# Patient Record
Sex: FEMALE | Race: WHITE | NOT HISPANIC OR LATINO | Employment: FULL TIME | URBAN - METROPOLITAN AREA
[De-identification: names, ages, dates, MRNs, and addresses within clinical notes are randomized per-mention and may not be internally consistent; named-entity substitution may affect disease eponyms.]

---

## 2017-01-04 ENCOUNTER — HOSPITAL ENCOUNTER (OUTPATIENT)
Dept: GENERAL RADIOLOGY | Facility: CLINIC | Age: 27
Discharge: HOME | End: 2017-01-04
Attending: ORTHOPAEDIC SURGERY | Admitting: ORTHOPAEDIC SURGERY
Payer: COMMERCIAL

## 2017-01-04 DIAGNOSIS — G89.29 CHRONIC HIP PAIN: ICD-10-CM

## 2017-01-04 DIAGNOSIS — M25.559 CHRONIC HIP PAIN: ICD-10-CM

## 2017-01-04 PROCEDURE — 25500064 ZZH RX 255 OP 636: Performed by: RADIOLOGY

## 2017-01-04 PROCEDURE — 25000125 ZZHC RX 250: Performed by: RADIOLOGY

## 2017-01-04 PROCEDURE — 20610 DRAIN/INJ JOINT/BURSA W/O US: CPT | Mod: LT

## 2017-01-04 RX ORDER — LIDOCAINE HYDROCHLORIDE 10 MG/ML
5 INJECTION, SOLUTION INFILTRATION; PERINEURAL ONCE
Status: COMPLETED | OUTPATIENT
Start: 2017-01-04 | End: 2017-01-04

## 2017-01-04 RX ORDER — IOPAMIDOL 408 MG/ML
50 INJECTION, SOLUTION INTRAVASCULAR ONCE
Status: COMPLETED | OUTPATIENT
Start: 2017-01-04 | End: 2017-01-04

## 2017-01-04 RX ORDER — BUPIVACAINE HYDROCHLORIDE 2.5 MG/ML
10 INJECTION, SOLUTION EPIDURAL; INFILTRATION; INTRACAUDAL ONCE
Status: COMPLETED | OUTPATIENT
Start: 2017-01-04 | End: 2017-01-04

## 2017-01-04 RX ORDER — TRIAMCINOLONE ACETONIDE 40 MG/ML
40 INJECTION, SUSPENSION INTRA-ARTICULAR; INTRAMUSCULAR ONCE
Status: COMPLETED | OUTPATIENT
Start: 2017-01-04 | End: 2017-01-04

## 2017-01-04 RX ADMIN — SODIUM BICARBONATE 0.3 MEQ: 84 INJECTION, SOLUTION INTRAVENOUS at 08:28

## 2017-01-04 RX ADMIN — IOPAMIDOL 1 ML: 408 INJECTION, SOLUTION INTRAVASCULAR at 08:29

## 2017-01-04 RX ADMIN — BUPIVACAINE HYDROCHLORIDE 4 ML: 2.5 INJECTION, SOLUTION EPIDURAL; INFILTRATION; INTRACAUDAL at 08:31

## 2017-01-04 RX ADMIN — LIDOCAINE HYDROCHLORIDE 1.2 ML: 10 INJECTION, SOLUTION EPIDURAL; INFILTRATION; INTRACAUDAL; PERINEURAL at 08:28

## 2017-01-04 RX ADMIN — TRIAMCINOLONE ACETONIDE 40 MG: 40 INJECTION, SUSPENSION INTRA-ARTICULAR; INTRAMUSCULAR at 08:31

## 2017-05-19 ENCOUNTER — OFFICE VISIT (OUTPATIENT)
Dept: FAMILY MEDICINE | Facility: CLINIC | Age: 27
End: 2017-05-19

## 2017-05-19 VITALS
HEIGHT: 60 IN | SYSTOLIC BLOOD PRESSURE: 108 MMHG | OXYGEN SATURATION: 97 % | BODY MASS INDEX: 23.95 KG/M2 | WEIGHT: 122 LBS | DIASTOLIC BLOOD PRESSURE: 69 MMHG | TEMPERATURE: 97.3 F | HEART RATE: 75 BPM

## 2017-05-19 DIAGNOSIS — N30.00 ACUTE CYSTITIS WITHOUT HEMATURIA: Primary | ICD-10-CM

## 2017-05-19 DIAGNOSIS — R30.0 DYSURIA: ICD-10-CM

## 2017-05-19 LAB
ALBUMIN UR-MCNC: NEGATIVE MG/DL
APPEARANCE UR: ABNORMAL
BACTERIA #/AREA URNS HPF: ABNORMAL /HPF
BILIRUB UR QL STRIP: NEGATIVE
COLOR UR AUTO: YELLOW
GLUCOSE UR STRIP-MCNC: NEGATIVE MG/DL
HGB UR QL STRIP: ABNORMAL
KETONES UR STRIP-MCNC: NEGATIVE MG/DL
LEUKOCYTE ESTERASE UR QL STRIP: NEGATIVE
NITRATE UR QL: POSITIVE
NON-SQ EPI CELLS #/AREA URNS LPF: ABNORMAL /LPF
PH UR STRIP: 6.5 PH (ref 5–7)
RBC #/AREA URNS AUTO: ABNORMAL /HPF (ref 0–2)
SP GR UR STRIP: 1.02 (ref 1–1.03)
URN SPEC COLLECT METH UR: ABNORMAL
UROBILINOGEN UR STRIP-ACNC: 0.2 EU/DL (ref 0.2–1)
WBC #/AREA URNS AUTO: ABNORMAL /HPF (ref 0–2)

## 2017-05-19 PROCEDURE — 87186 SC STD MICRODIL/AGAR DIL: CPT | Performed by: PHYSICIAN ASSISTANT

## 2017-05-19 PROCEDURE — 81001 URINALYSIS AUTO W/SCOPE: CPT | Performed by: PHYSICIAN ASSISTANT

## 2017-05-19 PROCEDURE — 87086 URINE CULTURE/COLONY COUNT: CPT | Performed by: PHYSICIAN ASSISTANT

## 2017-05-19 PROCEDURE — 99214 OFFICE O/P EST MOD 30 MIN: CPT | Performed by: PHYSICIAN ASSISTANT

## 2017-05-19 PROCEDURE — 87088 URINE BACTERIA CULTURE: CPT | Performed by: PHYSICIAN ASSISTANT

## 2017-05-19 RX ORDER — SULFAMETHOXAZOLE/TRIMETHOPRIM 800-160 MG
1 TABLET ORAL 2 TIMES DAILY
Qty: 14 TABLET | Refills: 0 | Status: SHIPPED | OUTPATIENT
Start: 2017-05-19 | End: 2019-07-25

## 2017-05-19 ASSESSMENT — ANXIETY QUESTIONNAIRES
6. BECOMING EASILY ANNOYED OR IRRITABLE: SEVERAL DAYS
IF YOU CHECKED OFF ANY PROBLEMS ON THIS QUESTIONNAIRE, HOW DIFFICULT HAVE THESE PROBLEMS MADE IT FOR YOU TO DO YOUR WORK, TAKE CARE OF THINGS AT HOME, OR GET ALONG WITH OTHER PEOPLE: NOT DIFFICULT AT ALL
3. WORRYING TOO MUCH ABOUT DIFFERENT THINGS: NOT AT ALL
2. NOT BEING ABLE TO STOP OR CONTROL WORRYING: NOT AT ALL
GAD7 TOTAL SCORE: 1
5. BEING SO RESTLESS THAT IT IS HARD TO SIT STILL: NOT AT ALL
7. FEELING AFRAID AS IF SOMETHING AWFUL MIGHT HAPPEN: NOT AT ALL
1. FEELING NERVOUS, ANXIOUS, OR ON EDGE: NOT AT ALL

## 2017-05-19 ASSESSMENT — PATIENT HEALTH QUESTIONNAIRE - PHQ9: 5. POOR APPETITE OR OVEREATING: NOT AT ALL

## 2017-05-19 NOTE — PATIENT INSTRUCTIONS
Rest and increase fluids.    May take motrin and tylenol as needed for discomfort.    Follow up in 2-3 days if no improvement of your symptoms. Recheck sooner if worsening pain, fevers, vomiting, or any other concerning symptoms.

## 2017-05-19 NOTE — MR AVS SNAPSHOT
After Visit Summary   5/19/2017    Kyara Davis    MRN: [id number]           Patient Information     Date Of Birth          1990        Visit Information        Provider Department      5/19/2017 3:00 PM Lyndsay Pate PA-C [facility]        Today's Diagnoses     Dysuria    -  1    Acute cystitis without hematuria          Care Instructions    Rest and increase fluids.    May take motrin and tylenol as needed for discomfort.    Follow up in 2-3 days if no improvement of your symptoms. Recheck sooner if worsening pain, fevers, vomiting, or any other concerning symptoms.          Follow-ups after your visit        Who to contact     If you have questions or need follow up information about today's clinic visit or your schedule please contact [facility] directly at [phone].  Normal or non-critical lab and imaging results will be communicated to you by [email or web address], letter or phone within 4 business days after the clinic has received the results. If you do not hear from us within 7 days, please contact the clinic through [email or web address] or phone. If you have a critical or abnormal lab result, we will notify you by phone as soon as possible.  Submit refill requests through [employer] or call your pharmacy and they will forward the refill request to us. Please allow 3 business days for your refill to be completed.          Additional Information About Your Visit        MyChart Information     [employer] gives you secure access to your electronic health record. If you see a primary care provider, you can also send messages to your care team and make appointments. If you have questions, please call your primary care clinic.  If you do not have a primary care provider, please call [phone] and they will assist you.        Care EveryWhere ID     This is your Care EveryWhere ID. This could be used by other organizations to access your [facility] medical records  [id number]         Your Vitals Were     Pulse Temperature Height Pulse Oximetry BMI (Body Mass Index)       75 97.3  F (36.3  C) (Oral) 5' (1.524 m) 97% 23.83 kg/m2        Blood Pressure from Last 3 Encounters:   05/19/17 108/69   03/25/16 110/69   02/16/16 105/64    Weight from Last 3 Encounters:   05/19/17 122 lb (55.3 kg)   03/25/16 120 lb 12.8 oz (54.8 kg)   02/16/16 126 lb (57.2 kg)              We Performed the Following     *UA reflex to Microscopic     Urine Culture Aerobic Bacterial     Urine Microscopic          Today's Medication Changes          These changes are accurate as of: 5/19/17  3:43 PM.  If you have any questions, ask your nurse or doctor.               These medicines have changed or have updated prescriptions.        Dose/Directions    sulfamethoxazole-trimethoprim 800-160 MG per tablet   Commonly known as:  BACTRIM DS/SEPTRA DS   This may have changed:  Another medication with the same name was removed. Continue taking this medication, and follow the directions you see here.   Used for:  Acute cystitis without hematuria   Changed by:  Lyndsay Pate PA-C        Dose:  1 tablet   Take 1 tablet by mouth 2 times daily   Quantity:  14 tablet   Refills:  0            Where to get your medicines      These medications were sent to [facility] [address] [facility], [facility] [address]  [address] [address] [address]     Phone:  [phone]     sulfamethoxazole-trimethoprim 800-160 MG per tablet                Primary Care Provider Office Phone # Fax #    Nathalia Montgomery [phone]       [facility] [address] [facility] [address]        Thank you!     Thank you for choosing [facility]  for your care. Our goal is always to provide you with excellent care. Hearing back from our patients is one way we can continue to improve our services. Please take a few minutes to complete the written survey that you may receive in  the mail after your visit with us. Thank you!             Your Updated Medication List - Protect others around you: Learn how to safely use, store and throw away your medicines at www.disposemymeds.org.          This list is accurate as of: 5/19/17  3:43 PM.  Always use your most recent med list.                   Brand Name Dispense Instructions for use    levonorgestrel-ethinyl estradiol 0.1-20 MG-MCG per tablet    AVIANE,ALESSE,LESSINA    84 tablet    Take 1 tablet by mouth daily       sulfamethoxazole-trimethoprim 800-160 MG per tablet    BACTRIM DS/SEPTRA DS    14 tablet    Take 1 tablet by mouth 2 times daily

## 2017-05-19 NOTE — PROGRESS NOTES
SUBJECTIVE:                                                    Kyara Davis is a 26 year old female who presents to clinic today for the following health issues:      URINARY TRACT SYMPTOMS     Onset: x 4-5 days    Description:   Painful urination (Dysuria): YES   Blood in urine (Hematuria): no  Delay in urine (Hesitency): no  Frequency: YES    Intensity: moderate    Progression of Symptoms:  worsening    Accompanying Signs & Symptoms:  Fever/chills: no   Flank pain no   Nausea and vomiting: no   Any vaginal symptoms: none  Abdominal/Pelvic Pain: no    History:   History of frequent UTI's: YES - last UTI was 2 years ago  History of kidney stones: no   Sexually Active: YES  Possibility of pregnancy: No    Precipitating factors:   none         Therapies Tried and outcome: none            Problem list and histories reviewed & adjusted, as indicated.  Additional history: none    Patient Active Problem List   Diagnosis     CARDIOVASCULAR SCREENING; LDL GOAL LESS THAN 160     Migraine     MVP (mitral valve prolapse)     Seasonal allergic rhinitis     Leg cramps in pregnancy     GERD (gastroesophageal reflux disease)     Past Surgical History:   Procedure Laterality Date     BIOPSY BREAST      1 month ago     BREAST SURGERY  2011    lump       Social History   Substance Use Topics     Smoking status: Never Smoker     Smokeless tobacco: Never Used     Alcohol use 0.0 oz/week     0 Standard drinks or equivalent per week     Family History   Problem Relation Age of Onset     Other Cancer Maternal Grandfather      full body     Other Cancer Paternal Grandmother      ovarian     DIABETES Paternal Grandfather      type 2     DIABETES Niece      type 1         Current Outpatient Prescriptions   Medication Sig Dispense Refill     sulfamethoxazole-trimethoprim (BACTRIM DS/SEPTRA DS) 800-160 MG per tablet Take 1 tablet by mouth 2 times daily 14 tablet 0     levonorgestrel-ethinyl estradiol (AVIANE,ALESSE,LESSINA) 0.1-20  MG-MCG per tablet Take 1 tablet by mouth daily 84 tablet 3     Allergies   Allergen Reactions     Vicodin [Hydrocodone-Acetaminophen] Nausea     BP Readings from Last 3 Encounters:   05/19/17 108/69   03/25/16 110/69   02/16/16 105/64    Wt Readings from Last 3 Encounters:   05/19/17 122 lb (55.3 kg)   03/25/16 120 lb 12.8 oz (54.8 kg)   02/16/16 126 lb (57.2 kg)                    Reviewed and updated as needed this visit by clinical staff  Tobacco  Allergies  Meds  Med Hx  Surg Hx  Fam Hx  Soc Hx      Reviewed and updated as needed this visit by Provider         ROS:  Constitutional, gi and gu systems are negative, except as otherwise noted.    OBJECTIVE:                                                    /69  Pulse 75  Temp 97.3  F (36.3  C) (Oral)  Ht 5' (1.524 m)  Wt 122 lb (55.3 kg)  SpO2 97%  BMI 23.83 kg/m2  Body mass index is 23.83 kg/(m^2).  GENERAL: healthy, alert and no distress  ABDOMEN: soft, nontender, no hepatosplenomegaly, no masses and bowel sounds normal  MS: no gross musculoskeletal defects noted, no edema  SKIN: no suspicious lesions or rashes  BACK: no CVA tenderness, no paralumbar tenderness    Diagnostic Test Results:  Results for orders placed or performed in visit on 05/19/17 (from the past 24 hour(s))   *UA reflex to Microscopic   Result Value Ref Range    Color Urine Yellow     Appearance Urine Cloudy     Glucose Urine Negative NEG mg/dL    Bilirubin Urine Negative NEG    Ketones Urine Negative NEG mg/dL    Specific Gravity Urine 1.020 1.003 - 1.035    Blood Urine Moderate (A) NEG    pH Urine 6.5 5.0 - 7.0 pH    Protein Albumin Urine Negative NEG mg/dL    Urobilinogen Urine 0.2 0.2 - 1.0 EU/dL    Nitrite Urine Positive (A) NEG    Leukocyte Esterase Urine Negative NEG    Source Midstream Urine    Urine Microscopic   Result Value Ref Range    WBC Urine 5-10 (A) 0 - 2 /HPF    RBC Urine 2-5 (A) 0 - 2 /HPF    Squamous Epithelial /LPF Urine Few FEW /LPF    Bacteria Urine Many  (A) NEG /HPF        ASSESSMENT/PLAN:                                                        ICD-10-CM    1. Acute cystitis without hematuria N30.00 sulfamethoxazole-trimethoprim (BACTRIM DS/SEPTRA DS) 800-160 MG per tablet   2. Dysuria R30.0 *UA reflex to Microscopic     Urine Culture Aerobic Bacterial     Urine Microscopic       Patient Instructions   Rest and increase fluids.    May take motrin and tylenol as needed for discomfort.    Follow up in 2-3 days if no improvement of your symptoms. Recheck sooner if worsening pain, fevers, vomiting, or any other concerning symptoms.        Lyndsay Pate PA-C  [facility]

## 2017-05-19 NOTE — NURSING NOTE
Chief Complaint   Patient presents with     UTI       Initial /69  Pulse 75  Temp 97.3  F (36.3  C) (Oral)  Ht 5' (1.524 m)  Wt 122 lb (55.3 kg)  SpO2 97%  BMI 23.83 kg/m2 Estimated body mass index is 23.83 kg/(m^2) as calculated from the following:    Height as of this encounter: 5' (1.524 m).    Weight as of this encounter: 122 lb (55.3 kg).  Medication Reconciliation: complete     Rosemary Cordero, cma

## 2017-05-20 ASSESSMENT — ANXIETY QUESTIONNAIRES: GAD7 TOTAL SCORE: 1

## 2017-05-20 ASSESSMENT — PATIENT HEALTH QUESTIONNAIRE - PHQ9: SUM OF ALL RESPONSES TO PHQ QUESTIONS 1-9: 3

## 2017-05-21 LAB
BACTERIA SPEC CULT: ABNORMAL
MICRO REPORT STATUS: ABNORMAL
MICROORGANISM SPEC CULT: ABNORMAL
SPECIMEN SOURCE: ABNORMAL

## 2019-04-19 ENCOUNTER — HEALTH MAINTENANCE LETTER (OUTPATIENT)
Age: 29
End: 2019-04-19

## 2019-06-13 ENCOUNTER — OFFICE VISIT (OUTPATIENT)
Dept: ORTHOPEDICS | Facility: CLINIC | Age: 29
End: 2019-06-13
Payer: MEDICAID

## 2019-06-13 ENCOUNTER — ANCILLARY PROCEDURE (OUTPATIENT)
Dept: GENERAL RADIOLOGY | Facility: CLINIC | Age: 29
End: 2019-06-13
Attending: PEDIATRICS
Payer: MEDICAID

## 2019-06-13 VITALS
SYSTOLIC BLOOD PRESSURE: 100 MMHG | WEIGHT: 127 LBS | DIASTOLIC BLOOD PRESSURE: 69 MMHG | BODY MASS INDEX: 23.37 KG/M2 | HEIGHT: 62 IN

## 2019-06-13 DIAGNOSIS — M25.511 RIGHT SHOULDER PAIN, UNSPECIFIED CHRONICITY: Primary | ICD-10-CM

## 2019-06-13 DIAGNOSIS — M25.511 RIGHT SHOULDER PAIN, UNSPECIFIED CHRONICITY: ICD-10-CM

## 2019-06-13 PROCEDURE — 99204 OFFICE O/P NEW MOD 45 MIN: CPT | Performed by: PEDIATRICS

## 2019-06-13 PROCEDURE — 73030 X-RAY EXAM OF SHOULDER: CPT | Mod: RT

## 2019-06-13 ASSESSMENT — MIFFLIN-ST. JEOR: SCORE: 1249.32

## 2019-06-13 NOTE — LETTER
6/13/2019         RE: Kyara Davis  [address]  [facility] [address]        Dear Colleague,    Thank you for referring your patient, Kyara Davis, to the [facility]. Please see a copy of my visit note below.    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 She reports no injury. She has been seeing a chiropractor for the last 2 weeks with increasing pain. She reports any use of her right arm increases her pain. She states her pain is in the lateral shoulder and radiates to her upper arm. She denies neck pain. She is right hand dominate    Location of Pain: right lateral shoulder  Duration of Pain: 4 week(s)  Rating of Pain at worst: 10/10  Rating of Pain Currently: 1/10  Symptoms are better with: nothing  Symptoms are worse with: any use of right arm  Additional Features:   Positive: popping and weakness   Negative: swelling, bruising, grinding, catching, locking, instability, paresthesias and numbness  Other evaluation and/or treatments so far consists of: Ibuprofen and chiropractic care  Prior History of related problems: nothing    Social History: business owner, computer work    Review of Systems  Skin: no bruising, no swelling  Musculoskeletal: as above  Neurologic: no numbness, paresthesias  Remainder of review of systems is negative including constitutional, CV, pulmonary, GI, except as noted in HPI or medical history.    Patient's current problem list, past medical and surgical history, and family history were reviewed.    Patient Active Problem List   Diagnosis     CARDIOVASCULAR SCREENING; LDL GOAL LESS THAN 160     Migraine     MVP (mitral valve prolapse)     Seasonal allergic rhinitis     Leg cramps in pregnancy     GERD (gastroesophageal reflux disease)     Past Medical  "History:   Diagnosis Date     Coronary artery disease 2001    prolapsed mitral valve     Mitral valve prolapse unknown     Past Surgical History:   Procedure Laterality Date     BIOPSY BREAST      1 month ago     BREAST SURGERY  2011    lump     Family History   Problem Relation Age of Onset     Other Cancer Maternal Grandfather         full body     Other Cancer Paternal Grandmother         ovarian     Diabetes Paternal Grandfather         type 2     Diabetes Niece         type 1         Objective  /69   Ht 1.575 m (5' 2\")   Wt 57.6 kg (127 lb)   BMI 23.23 kg/m       GENERAL APPEARANCE: healthy, alert and no distress   GAIT: NORMAL  SKIN: no suspicious lesions or rashes  HEENT: Sclera clear, anicteric  CV: good peripheral pulses  RESP: Breathing not labored  NEURO: Normal strength and tone, mentation intact and speech normal  PSYCH:  mentation appears normal and affect normal/bright    Cervical Spine Exam  Inspection:       No visible deformity        normal lordotic curvature maintained    Posture:      normal    Tender:      none    Non-Tender:      remainder of cervical spine area    Range of Motion:       Full active and passive ROM forward flexion, extension, lateral rotation, lateral flexion.    Painful Motions:      none    Strength:     C4 (shoulder shrug)  symmetric 5/5       C5 (shoulder abduction) symmetric 5/5       C6 (elbow flexion) symmetric 5/5       C7 (elbow extension) symmetric 5/5       C8 (finger abduction, thumb flexion) symmetric 5/5    Reflexes:      C5 (biceps) symmetric normal       C6 (supinator) symmetric normal       C7 (triceps) symmetric normal    Sensation:     grossly intact througout bilateral upper extremities    Special Tests:      neg (-) Spurling    Skin:     well perfused       capillary refill brisk    Lymphatics:      no edema noted in the upper extremities     Bilateral Shoulder exam    Inspection and Posture:       rounded shoulders and upper back    Skin:        " no visible deformities    Tender:        subacromial space right    Non Tender:       remainder of shoulder bilateral    ROM:        Full active and passive ROM with flexion, extension, abduction, internal and external rotation bilateral       asymmetric scapular motion    Painful motions:       end range flexion and elevation right    Strength:        abduction 5/5 bilateral       flexion 5/5 bilateral       internal rotation 5/5 bilateral       external rotation 3/5 right    Impingement testing:       positive (+) Neer right       positive (+) Fang right    Sensation:        normal sensation over shoulder and upper extremity       Radiology  I ordered, visualized and reviewed these images with the patient  3 XR views of right shoulder reviewed: no acute bony abnormality, no significant degenerative change  - will follow official read    Assessment:  1. Right shoulder pain, unspecified chronicity      Concern for rotator cuff tear, I recommend MRI.  Cervical etiology less likely.  PT in the interim.  Discussed OTC medications (I reviewed the mechanism of action as well as risk/benefit profile of medications. Questions answered.)    Plan:  - Today's Plan of Care:  Over the counter medication: Acetaminophen (Tylenol) 1000mg every 6 hours with food (Maximum of 3000mg/day)  Naproxen (Aleve) maximum of 440mg two times a day with food  MRI of the right shoulder. Call [phone] to schedule MRI  Rehab: Physical Therapy: [facility] - [phone]    -We also discussed other future treatment options:  Referral to orthopedic surgeon    Follow Up: In clinic with Dr. Mayers after MRI (wait at least 1-2 days)    Concerning signs and symptoms were reviewed.  The patient expressed understanding of this management plan and all questions were answered at this time.    Narda Mayers MD [facility]  Primary Care Sports Medicine  [facility]    Again, thank you for allowing me to  participate in the care of your patient.        Sincerely,        Narda Mayers MD

## 2019-06-13 NOTE — PROGRESS NOTES
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 She reports no injury. She has been seeing a chiropractor for the last 2 weeks with increasing pain. She reports any use of her right arm increases her pain. She states her pain is in the lateral shoulder and radiates to her upper arm. She denies neck pain. She is right hand dominate    Location of Pain: right lateral shoulder  Duration of Pain: 4 week(s)  Rating of Pain at worst: 10/10  Rating of Pain Currently: 1/10  Symptoms are better with: nothing  Symptoms are worse with: any use of right arm  Additional Features:   Positive: popping and weakness   Negative: swelling, bruising, grinding, catching, locking, instability, paresthesias and numbness  Other evaluation and/or treatments so far consists of: Ibuprofen and chiropractic care  Prior History of related problems: nothing    Social History: business owner, computer work    Review of Systems  Skin: no bruising, no swelling  Musculoskeletal: as above  Neurologic: no numbness, paresthesias  Remainder of review of systems is negative including constitutional, CV, pulmonary, GI, except as noted in HPI or medical history.    Patient's current problem list, past medical and surgical history, and family history were reviewed.    Patient Active Problem List   Diagnosis     CARDIOVASCULAR SCREENING; LDL GOAL LESS THAN 160     Migraine     MVP (mitral valve prolapse)     Seasonal allergic rhinitis     Leg cramps in pregnancy     GERD (gastroesophageal reflux disease)     Past Medical History:   Diagnosis Date     Coronary artery disease 2001    prolapsed mitral valve     Mitral valve prolapse unknown     Past Surgical History:   Procedure Laterality Date     BIOPSY BREAST      1 month ago     BREAST SURGERY  2011    lump     Family History  "  Problem Relation Age of Onset     Other Cancer Maternal Grandfather         full body     Other Cancer Paternal Grandmother         ovarian     Diabetes Paternal Grandfather         type 2     Diabetes Niece         type 1         Objective  /69   Ht 1.575 m (5' 2\")   Wt 57.6 kg (127 lb)   BMI 23.23 kg/m      GENERAL APPEARANCE: healthy, alert and no distress   GAIT: NORMAL  SKIN: no suspicious lesions or rashes  HEENT: Sclera clear, anicteric  CV: good peripheral pulses  RESP: Breathing not labored  NEURO: Normal strength and tone, mentation intact and speech normal  PSYCH:  mentation appears normal and affect normal/bright    Cervical Spine Exam  Inspection:       No visible deformity        normal lordotic curvature maintained    Posture:      normal    Tender:      none    Non-Tender:      remainder of cervical spine area    Range of Motion:       Full active and passive ROM forward flexion, extension, lateral rotation, lateral flexion.    Painful Motions:      none    Strength:     C4 (shoulder shrug)  symmetric 5/5       C5 (shoulder abduction) symmetric 5/5       C6 (elbow flexion) symmetric 5/5       C7 (elbow extension) symmetric 5/5       C8 (finger abduction, thumb flexion) symmetric 5/5    Reflexes:      C5 (biceps) symmetric normal       C6 (supinator) symmetric normal       C7 (triceps) symmetric normal    Sensation:     grossly intact througout bilateral upper extremities    Special Tests:      neg (-) Spurling    Skin:     well perfused       capillary refill brisk    Lymphatics:      no edema noted in the upper extremities     Bilateral Shoulder exam    Inspection and Posture:       rounded shoulders and upper back    Skin:        no visible deformities    Tender:        subacromial space right    Non Tender:       remainder of shoulder bilateral    ROM:        Full active and passive ROM with flexion, extension, abduction, internal and external rotation bilateral       asymmetric " scapular motion    Painful motions:       end range flexion and elevation right    Strength:        abduction 5/5 bilateral       flexion 5/5 bilateral       internal rotation 5/5 bilateral       external rotation 3/5 right    Impingement testing:       positive (+) Neer right       positive (+) Fang right    Sensation:        normal sensation over shoulder and upper extremity       Radiology  I ordered, visualized and reviewed these images with the patient  3 XR views of right shoulder reviewed: no acute bony abnormality, no significant degenerative change  - will follow official read    Assessment:  1. Right shoulder pain, unspecified chronicity      Concern for rotator cuff tear, I recommend MRI.  Cervical etiology less likely.  PT in the interim.  Discussed OTC medications (I reviewed the mechanism of action as well as risk/benefit profile of medications. Questions answered.)    Plan:  - Today's Plan of Care:  Over the counter medication: Acetaminophen (Tylenol) 1000mg every 6 hours with food (Maximum of 3000mg/day)  Naproxen (Aleve) maximum of 440mg two times a day with food  MRI of the right shoulder. Call [phone] to schedule MRI  Rehab: Physical Therapy: [facility] - [phone]    -We also discussed other future treatment options:  Referral to orthopedic surgeon    Follow Up: In clinic with Dr. Mayers after MRI (wait at least 1-2 days)    Concerning signs and symptoms were reviewed.  The patient expressed understanding of this management plan and all questions were answered at this time.    Narda Mayers MD CAQ  Primary Care Sports Medicine  [facility]

## 2019-06-13 NOTE — PATIENT INSTRUCTIONS
Plan:  - Today's Plan of Care:  Over the counter medication: Acetaminophen (Tylenol) 1000mg every 6 hours with food (Maximum of 3000mg/day)  Naproxen (Aleve) maximum of 440mg two times a day with food  MRI of the right shoulder. Call [phone] to schedule MRI  Rehab: Physical Therapy: [facility] - [phone]    -We also discussed other future treatment options:  Referral to orthopedic surgeon    Follow Up: In clinic with Dr. Mayers after MRI (wait at least 1-2 days)    If you have any further questions for your physician or physician s care team you can call [phone] and use option 3 to leave a voice message. Calls received during business hours will be returned same day.

## 2019-06-18 ENCOUNTER — ANCILLARY PROCEDURE (OUTPATIENT)
Dept: MRI IMAGING | Facility: CLINIC | Age: 29
End: 2019-06-18
Attending: PEDIATRICS
Payer: MEDICAID

## 2019-06-18 DIAGNOSIS — M25.511 RIGHT SHOULDER PAIN, UNSPECIFIED CHRONICITY: ICD-10-CM

## 2019-06-18 PROCEDURE — 73221 MRI JOINT UPR EXTREM W/O DYE: CPT | Mod: TC

## 2019-06-20 ENCOUNTER — OFFICE VISIT (OUTPATIENT)
Dept: ORTHOPEDICS | Facility: CLINIC | Age: 29
End: 2019-06-20
Payer: MEDICAID

## 2019-06-20 VITALS
HEIGHT: 62 IN | SYSTOLIC BLOOD PRESSURE: 111 MMHG | BODY MASS INDEX: 23.37 KG/M2 | DIASTOLIC BLOOD PRESSURE: 73 MMHG | WEIGHT: 127 LBS

## 2019-06-20 DIAGNOSIS — M25.511 RIGHT SHOULDER PAIN, UNSPECIFIED CHRONICITY: Primary | ICD-10-CM

## 2019-06-20 DIAGNOSIS — M25.819 CYST OF JOINT OF SHOULDER: ICD-10-CM

## 2019-06-20 PROCEDURE — 99214 OFFICE O/P EST MOD 30 MIN: CPT | Performed by: PEDIATRICS

## 2019-06-20 ASSESSMENT — MIFFLIN-ST. JEOR: SCORE: 1249.32

## 2019-06-20 NOTE — PROGRESS NOTES
Sports Medicine Clinic Visit - Interim History June 20, 2019    PCP: Nathalia Montgomery    Kyara Davis is a 29 year old female who is seen in f/u up for Right shoulder pain, unspecified chronicity. Since last visit on 6/13/19 patient has completed an MRI of the right shoulder. She reports no significant change in her symptoms.  Here to review results.    Initial Injury 6/13/2019: She reports 4 weeks of right shoulder pain. She reports no injury. She has been seeing a chiropractor for the last 2 weeks with increasing pain. She reports any use of her right arm increases her pain. She states her pain is in the lateral shoulder and radiates to her upper arm. She denies neck pain. She is right hand dominate    Symptoms are better with: Ibuprofen and tylenol  Symptoms are worse with: use of right arm  Additional Features:   Positive: popping and weakness   Negative: swelling, bruising, grinding, catching, locking, instability, paresthesias and numbness    Social History: business owner, computer work    Review of Systems  Skin: no bruising, no swelling  Musculoskeletal: as above  Neurologic: no numbness, paresthesias  Remainder of review of systems is negative including constitutional, CV, pulmonary, GI, except as noted in HPI or medical history.    Patient's current problem list, past medical and surgical history, and family history were reviewed.    Patient Active Problem List   Diagnosis     CARDIOVASCULAR SCREENING; LDL GOAL LESS THAN 160     Migraine     MVP (mitral valve prolapse)     Seasonal allergic rhinitis     Leg cramps in pregnancy     GERD (gastroesophageal reflux disease)     Past Medical History:   Diagnosis Date     Coronary artery disease 2001    prolapsed mitral valve     Mitral valve prolapse unknown     Past Surgical History:   Procedure Laterality Date     BIOPSY BREAST      1 month ago     BREAST SURGERY  2011    lump     Family History   Problem Relation Age of Onset     Other Cancer  "Maternal Grandfather         full body     Other Cancer Paternal Grandmother         ovarian     Diabetes Paternal Grandfather         type 2     Diabetes Niece         type 1       Objective  /73 (BP Location: Right arm, Patient Position: Chair, Cuff Size: Adult Regular)   Ht 1.575 m (5' 2\")   Wt 57.6 kg (127 lb)   BMI 23.23 kg/m      GENERAL APPEARANCE: healthy, alert and no distress   GAIT: NORMAL  SKIN: no suspicious lesions or rashes  HEENT: Sclera clear, anicteric  CV: good peripheral pulses  RESP: Breathing not labored  NEURO: Normal strength and tone, mentation intact and speech normal  PSYCH:  mentation appears normal and affect normal/bright    Bilateral Shoulder exam  Inspection and Posture:       rounded shoulders and upper back     Skin:        no visible deformities     Tender:        subacromial space right     Non Tender:       remainder of shoulder bilateral     ROM:        Full active and passive ROM with flexion, extension, abduction, internal and external rotation bilateral       asymmetric scapular motion     Painful motions:       end range flexion and elevation right     Strength:        abduction 5/5 bilateral       flexion 5/5 bilateral       internal rotation 5/5 bilateral       external rotation 3/5 right     Impingement testing:       positive (+) Neer right       positive (+) Fang right     Sensation:        normal sensation over shoulder and upper extremity     Radiology  I ordered, visualized and reviewed these images with the patient  MRI RIGHT SHOULDER WITHOUT CONTRAST  6/18/2019 7:49 AM     HISTORY: Four weeks of right shoulder pain. Evaluate for rotator cuff  tear.     COMPARISON: Radiographs on 6/13/2019.     TECHNIQUE: Multiplanar MR imaging was performed without contrast.     FINDINGS:  Osseous acromion outlet: There is a type II configuration of the  acromion with no significant lateral or anterior downsloping. There  are no degenerative changes of the " acromioclavicular joint. The outlet  is widely patent. No os acromiale.     Rotator cuff: The supraspinatus, infraspinatus, subscapularis, and  teres minor tendons and visualized muscles are normal.      Labrum: No tear or other labral pathology is demonstrated. However,  there is a 2.1 cm long somewhat lobulated cyst situated just medial to  the posterior superior labrum extending towards the spinoglenoid  notch. A thin extension of this fluid is seen adjacent to the  posterior superior labrum.     Biceps tendon: The biceps tendon is in the bicipital groove. It is  intact and demonstrates normal signal.     Osseous structures and cartilaginous surfaces: No fracture or osseous  lesion is seen. No abnormal marrow signal intensity is identified. The  cartilage surfaces are well preserved.     Additional findings: No joint effusion is demonstrated. There is no  fluid within the subacromial-subdeltoid bursa. The adjacent soft  tissues are unremarkable.                                                                      IMPRESSION: There is a 2.1 cm long cyst adjacent to the posterior  superior labrum extending into the spinoglenoid notch. Although no  definite labral pathology is seen, this has the appearance of a  paralabral cyst which is often associated with labral abnormalities.  An MRI arthrogram may be beneficial for further evaluation of the  labrum. No rotator cuff pathology is seen.       Assessment:  1. Right shoulder pain, unspecified chronicity    2. Cyst of joint of shoulder      We discussed these other possible diagnosis: labral cyst, labral tear  Right shoulder pain with labral cyst impinging spinoglenoid notch.  Discussed trial of physical therapy, will also refer to shoulder surgeon for follow up and discussion of next steps in treatment.    Plan:  - Today's Plan of Care:  Referral to an Orthopedic Surgeon - Shoulder  Rehab: Physical Therapy: [facility] - [phone]    Follow  Up: as needed    Concerning signs and symptoms were reviewed.  The patient expressed understanding of this management plan and all questions were answered at this time.    Narda Mayers MD CA  Primary Care Sports Medicine  [facility]

## 2019-06-20 NOTE — LETTER
6/20/2019         RE: Kyara Davis  [address]  [facility] [address]        Dear Colleague,    Thank you for referring your patient, Kyara Davis, to the [facility]. Please see a copy of my visit note below.    Sports Medicine Clinic Visit - Interim History June 20, 2019    PCP: Nathalia Montgomery    Kyara Davis is a 29 year old female who is seen in f/u up for Right shoulder pain, unspecified chronicity. Since last visit on 6/13/19 patient has completed an MRI of the right shoulder. She reports no significant change in her symptoms.  Here to review results.    Initial Injury 6/13/2019: She reports 4 weeks of right shoulder pain. She reports no injury. She has been seeing a chiropractor for the last 2 weeks with increasing pain. She reports any use of her right arm increases her pain. She states her pain is in the lateral shoulder and radiates to her upper arm. She denies neck pain. She is right hand dominate    Symptoms are better with: Ibuprofen and tylenol  Symptoms are worse with: use of right arm  Additional Features:   Positive: popping and weakness   Negative: swelling, bruising, grinding, catching, locking, instability, paresthesias and numbness    Social History: business owner, computer work    Review of Systems  Skin: no bruising, no swelling  Musculoskeletal: as above  Neurologic: no numbness, paresthesias  Remainder of review of systems is negative including constitutional, CV, pulmonary, GI, except as noted in HPI or medical history.    Patient's current problem list, past medical and surgical history, and family history were reviewed.    Patient Active Problem List   Diagnosis     CARDIOVASCULAR SCREENING; LDL GOAL LESS THAN 160     Migraine     MVP (mitral valve prolapse)     Seasonal allergic rhinitis     Leg cramps in pregnancy     GERD (gastroesophageal reflux disease)     Past Medical History:   Diagnosis Date     Coronary artery disease  "2001    prolapsed mitral valve     Mitral valve prolapse unknown     Past Surgical History:   Procedure Laterality Date     BIOPSY BREAST      1 month ago     BREAST SURGERY  2011    lump     Family History   Problem Relation Age of Onset     Other Cancer Maternal Grandfather         full body     Other Cancer Paternal Grandmother         ovarian     Diabetes Paternal Grandfather         type 2     Diabetes Niece         type 1       Objective  /73 (BP Location: Right arm, Patient Position: Chair, Cuff Size: Adult Regular)   Ht 1.575 m (5' 2\")   Wt 57.6 kg (127 lb)   BMI 23.23 kg/m       GENERAL APPEARANCE: healthy, alert and no distress   GAIT: NORMAL  SKIN: no suspicious lesions or rashes  HEENT: Sclera clear, anicteric  CV: good peripheral pulses  RESP: Breathing not labored  NEURO: Normal strength and tone, mentation intact and speech normal  PSYCH:  mentation appears normal and affect normal/bright    Bilateral Shoulder exam  Inspection and Posture:       rounded shoulders and upper back     Skin:        no visible deformities     Tender:        subacromial space right     Non Tender:       remainder of shoulder bilateral     ROM:        Full active and passive ROM with flexion, extension, abduction, internal and external rotation bilateral       asymmetric scapular motion     Painful motions:       end range flexion and elevation right     Strength:        abduction 5/5 bilateral       flexion 5/5 bilateral       internal rotation 5/5 bilateral       external rotation 3/5 right     Impingement testing:       positive (+) Neer right       positive (+) Fang right     Sensation:        normal sensation over shoulder and upper extremity     Radiology  I ordered, visualized and reviewed these images with the patient  MRI RIGHT SHOULDER WITHOUT CONTRAST  6/18/2019 7:49 AM     HISTORY: Four weeks of right shoulder pain. Evaluate for rotator cuff  tear.     COMPARISON: Radiographs on " 6/13/2019.     TECHNIQUE: Multiplanar MR imaging was performed without contrast.     FINDINGS:  Osseous acromion outlet: There is a type II configuration of the  acromion with no significant lateral or anterior downsloping. There  are no degenerative changes of the acromioclavicular joint. The outlet  is widely patent. No os acromiale.     Rotator cuff: The supraspinatus, infraspinatus, subscapularis, and  teres minor tendons and visualized muscles are normal.      Labrum: No tear or other labral pathology is demonstrated. However,  there is a 2.1 cm long somewhat lobulated cyst situated just medial to  the posterior superior labrum extending towards the spinoglenoid  notch. A thin extension of this fluid is seen adjacent to the  posterior superior labrum.     Biceps tendon: The biceps tendon is in the bicipital groove. It is  intact and demonstrates normal signal.     Osseous structures and cartilaginous surfaces: No fracture or osseous  lesion is seen. No abnormal marrow signal intensity is identified. The  cartilage surfaces are well preserved.     Additional findings: No joint effusion is demonstrated. There is no  fluid within the subacromial-subdeltoid bursa. The adjacent soft  tissues are unremarkable.                                                                      IMPRESSION: There is a 2.1 cm long cyst adjacent to the posterior  superior labrum extending into the spinoglenoid notch. Although no  definite labral pathology is seen, this has the appearance of a  paralabral cyst which is often associated with labral abnormalities.  An MRI arthrogram may be beneficial for further evaluation of the  labrum. No rotator cuff pathology is seen.       Assessment:  1. Right shoulder pain, unspecified chronicity    2. Cyst of joint of shoulder      We discussed these other possible diagnosis: labral cyst, labral tear  Right shoulder pain with labral cyst impinging spinoglenoid notch.  Discussed trial of physical  therapy, will also refer to shoulder surgeon for follow up and discussion of next steps in treatment.    Plan:  - Today's Plan of Care:  Referral to an Orthopedic Surgeon - Shoulder  Rehab: Physical Therapy: [facility] - [phone]    Follow Up: as needed    Concerning signs and symptoms were reviewed.  The patient expressed understanding of this management plan and all questions were answered at this time.    Narda Mayers MD [facility]  Primary Care Sports Medicine  [facility]    Again, thank you for allowing me to participate in the care of your patient.        Sincerely,        Narda Mayers MD

## 2019-06-20 NOTE — PATIENT INSTRUCTIONS
We discussed these other possible diagnosis: labral cyst, labral tear    Plan:  - Today's Plan of Care:  Referral to an Orthopedic Surgeon - Shoulder  Rehab: Physical Therapy: [facility] - [phone]    Follow Up: as needed    If you have any further questions for your physician or physician s care team you can call [phone] and use option 3 to leave a voice message. Calls received during business hours will be returned same day.

## 2019-06-24 NOTE — TELEPHONE ENCOUNTER
surgical consult // R Shoulder Pain // Dr. Mayers at  [facility] referring // referral/recs in system // OK     RECORDS RECEIVED FROM: Internal   DATE RECEIVED: 07/08/19   NOTES STATUS DETAILS   OFFICE NOTE from referring provider Internal 06/20/19 Dr. Mayers   OFFICE NOTE from other specialist n/a    DISCHARGE SUMMARY from hospital n/a    DISCHARGE REPORT from the ER n/a    OPERATIVE REPORT n/a    MEDICATION LIST Internal    IMPLANT RECORD/STICKER n/a    LABS     CBC/DIFF Internal 01/20/16   CULTURES n/a    INJECTIONS DONE IN RADIOLOGY n/a    MRI Internal 06/18/19 r shoulder   CT SCAN n/a    XRAYS (IMAGES & REPORTS) Internal 06/13/19   TUMOR     PATHOLOGY  Slides & report n/a

## 2019-07-08 ENCOUNTER — PRE VISIT (OUTPATIENT)
Dept: ORTHOPEDICS | Facility: CLINIC | Age: 29
End: 2019-07-08

## 2019-07-08 ENCOUNTER — OFFICE VISIT (OUTPATIENT)
Dept: ORTHOPEDICS | Facility: CLINIC | Age: 29
End: 2019-07-08
Payer: MEDICAID

## 2019-07-08 VITALS — HEIGHT: 62 IN | BODY MASS INDEX: 23.37 KG/M2 | WEIGHT: 127 LBS

## 2019-07-08 DIAGNOSIS — S43.432A TEAR OF LEFT GLENOID LABRUM, INITIAL ENCOUNTER: Primary | ICD-10-CM

## 2019-07-08 ASSESSMENT — ENCOUNTER SYMPTOMS: ARTHRALGIAS: 1

## 2019-07-08 ASSESSMENT — MIFFLIN-ST. JEOR: SCORE: 1249.32

## 2019-07-08 NOTE — LETTER
7/8/2019       RE: Kyara Davis  [address]  [facility] [address]     Dear Colleague,    Thank you for referring your patient, Kyara Davis, to the [facility]. Please see a copy of my visit note below.    I saw the patient with the resident.  I agree with the resident note and plan of care.      Matheus Resendiz MD    [facility]  Orthopaedic Consultation    Kyara Davis MRN# [id number]   Age: 29 year old YOB: 1990     Requesting physician: Nathalia Walters            Assessment and Plan:   Assessment:  Right shoulder labral tear and spinoglenoid notch cyst     Plan:  We discussed at length with the patient that she likely has a labral tear in her right shoulder based on clinical and imaging findings.  Notably, there is a para-labral cyst in her spinal glenoid notch, and this is responsible for her weakness in external rotation. If the nerve is compressed for too long by the cyst, it will result in permanent muscle damage/weakness. Our recommendation is an arthroscopic labral repair and para labral cyst removal with release of the suprascapular nerve.  Risks and benefits were reviewed, including but not limited to possible nerve/vascular injury, bleeding, incomplete pain relief, and the cardiopulmonary risks of anesthesia itself.  Postoperatively, she will be in a sling for 2 weeks, be allowed to start her activities of daily living at 6 weeks, and begin strengthening at 3 months.  Will take at least a year for full recovery.  The patient voiced good understanding of the above and would like to proceed.  We will work on scheduling surgery on July 31 and look forward to seeing her again on that day.    This patient was seen & discussed with Dr. Resendiz, who agrees with the findings and plan as above.    Elver Wood  Orthopaedic PGY5             History of Present Illness:  "  29-year-old right-hand-dominant female, with a history of prolapsed mitral valve, presents with 6-day weeks of acute right shoulder pain.   The patient is an avid snowmobiler and dirtbiker, and prior to this episode she would intermittently have pain near the inferior axilla.  However, 6 weeks ago she woke up one morning with sudden, intense pain in the superior shoulder near the AC joint, radiating down the lateral arm. She cannot recall any precipitating event or trauma.  Her pain was exacerbated by overhead activities, such as reaching in a cabinet for a bowl of cereal, and will be present even at rest and wake her up at night.  It peaked about 3 weeks ago and caused her to seek medical attention with Dr. Mayers at [facility] in on 6/13/19; exam was concerning for rotator cuff pathology and MRI was ordered. She was then referred to [facility] for further evaluation.  She was also advised to rest her shoulder.  Since then, the pain has calmed down over the past 2 weeks and is more tolerable.  Ibuprofen has equivocal effect.  Resting provides most relief.  Never tried shoulder physical therapy or injections.  Now she describes the pain as a soreness, like a muscle cramp or muscle spasm, that occurs while moving, lifting, or carrying anything above shoulder level.  It is still localizes to the anterior superior aspect near the AC joint.  She also thinks her right shoulder has become weaker over the past months.  Occasionally she will have tingling in her bilateral ring and small fingers when she is using the keyboard, but denies any neck pain or radicular pain.         Physical Exam:     EXAMINATION pertinent findings:   VITAL SIGNS: Height 1.575 m (5' 2\"), weight 57.6 kg (127 lb).  Body mass index is 23.23 kg/m .  RESP: non labored breathing   ABD: benign   SKIN: grossly normal   LYMPHATIC: grossly normal   NEURO: grossly normal   VASCULAR: satisfactory perfusion of all extremities " "  MUSCULOSKELETAL:   Focused examination of the right upper extremity shows no visible muscle atrophy.  Scapular motion is symmetric bilaterally.  Tender to palpation at the bicipital groove, anterior glenohumeral joint line, and posterior superior shoulder.  No tenderness over the AC joint, coracoid, and lateral acromion.  Actively and passively, the shoulder forward elevates to 170, abducts to 160, externally rotates to 75, and internally rotates to T12.  She has pain at the anterior superior shoulder with terminal forward elevation, whereas terminal internal and external rotation cause pain in the posterior superior shoulder.  There is a hitch due to pain when she actively lowers her shoulder to 140 degrees forward elevation.    Speeds and Yergason's elicit pain at the anterior superior shoulder, which she describes as \"inside\" the joint.  Posterior superior shoulder pain with Fang.  Negative Neer's.  Pain at the anterior superior shoulder and 4/5 strength with liftoff.  No pain or weakness with belly press and bearhug.  4/5 external rotation giving way due to weakness, otherwise 5/5 strength with rotator cuff testing.   5/5 EPL, FPL, interossei.  Sensation intact light touch in median, radial, ulnar, and axillary nerve distributions. 2+ radial pulse.         Data:   All laboratory data reviewed  All imaging studies reviewed by me    MRI of the right shoulder is notable for a spinoglenoid notch cyst. Labral tear is not directly visible. Does not include fat-saturated images, thus it is difficult to determine if there is fatty infiltration or edema within the rotator cuff musculature.  Overall, rotator cuff muscle bulk seems to be preserved.  The rotator cuff tendons are intact.  Mild edema at the AC joint.      DATA for DOCUMENTATION:         Past Medical History:     Prolapsed mitral valve  Left hip labral tear    No personal history of complications from DVT/PE, bleeding, or anesthesia.    Also see scanned " health assessment forms.         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  Non-smoker.  Drinks alcohol socially.  Denies illicit drug use.  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              Family History:     Family History   Problem Relation Age of Onset     Other Cancer Maternal Grandfather         full body     Other Cancer Paternal Grandmother         ovarian     Diabetes Paternal Grandfather         type 2     Diabetes Niece         type 1            Medications:     Current Outpatient Medications   Medication Sig     levonorgestrel-ethinyl estradiol (AVIANE,ALESSE,LESSINA) 0.1-20 MG-MCG per tablet Take 1 tablet by mouth daily     sulfamethoxazole-trimethoprim (BACTRIM DS/SEPTRA DS) 800-160 MG per tablet Take 1 tablet by mouth 2 times daily     No current facility-administered medications for this visit.             Review of Systems:   A comprehensive 10 point review of systems (constitutional, ENT, cardiac, peripheral vascular, lymphatic, respiratory, GI, , Musculoskeletal, skin, Neurological) was performed and found to be negative except as described in this note.     See intake form completed by patient    Again, thank you for allowing me to participate in the care of your patient.      Sincerely,    Matheus Resendiz MD

## 2019-07-08 NOTE — NURSING NOTE
"Reason For Visit:   Chief Complaint   Patient presents with     Consult     Right shoulder pain       PCP: Nathalia Montgomery    ?  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 Yes.  Work status?  Full time.  Date of injury: None  Date of surgery: None  Smoker: No    Right hand dominant    SANE score  Affected shoulder: Right   Right shoulder SANE: 30  Left shoulder SANE: 100    Dynamometer  Forward Elevation:  R = 6 pounds,  L = 14 pounds  External Rotation:   R = 5 pounds,  L = 15 pounds    Ht 1.575 m (5' 2\")   Wt 57.6 kg (127 lb)   BMI 23.23 kg/m        Pain Assessment  Patient Currently in Pain: Yes  0-10 Pain Scale: 2  Primary Pain Location: Shoulder  Pain Descriptors: Discomfort      Chloé Merritt LPN        "

## 2019-07-08 NOTE — NURSING NOTE
Teaching Flowsheet   Relevant Diagnosis: Labral tear, paralabral cyst  Teaching Topic: Pre-op for SLAP, arthroscopic  paralabral cyst removal - will need insurance verification (applied for Blue Plus) before scheduling surgery     Person(s) involved in teaching:   Patient     Motivation Level:  Asks Questions: Yes  Eager to Learn: Yes  Cooperative: Yes  Receptive (willing/able to accept information): Yes  Any cultural factors/Temple beliefs that may influence understanding or compliance? No     Patient demonstrates understanding of the following:  Reason for the appointment, diagnosis and treatment plan: Yes  Knowledge of proper use of medications and conditions for which they are ordered (with special attention to potential side effects or drug interactions): Yes  Which situations necessitate calling provider and whom to contact: Yes     Teaching Concerns Addressed:   Pre-op by primary provider at [facility]  Boyfriend or her mother will provide transportation and assistance with cares after surgery  Aware of post-op limitations     Proper use and care of sling x 6 weeks (medical equip, care aids, etc.): Yes  Nutritional needs and diet plan: Yes  Pain management techniques: Yes  Wound Care: Yes  How and/when to access community resources: Yes     Instructional Materials Used/Given: Pre-op packet, surgical soap, written guidelines for care after shoulder arthroscopic surgery

## 2019-07-08 NOTE — PROGRESS NOTES
I saw the patient with the resident.  I agree with the resident note and plan of care.      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  Notably, there is a para-labral cyst in her spinal glenoid notch, and this is responsible for her weakness in external rotation. If the nerve is compressed for too long by the cyst, it will result in permanent muscle damage/weakness. Our recommendation is an arthroscopic labral repair and para labral cyst removal with release of the suprascapular nerve.  Risks and benefits were reviewed, including but not limited to possible nerve/vascular injury, bleeding, incomplete pain relief, and the cardiopulmonary risks of anesthesia itself.  Postoperatively, she will be in a sling for 2 weeks, be allowed to start her activities of daily living at 6 weeks, and begin strengthening at 3 months.  Will take at least a year for full recovery.  The patient voiced good understanding of the above and would like to proceed.  We will work on scheduling surgery on July 31 and look forward to seeing her again on that day.    This patient was seen & discussed with Dr. Resendiz, who agrees with the findings and plan as above.    Elver Durand [facility]  Orthopaedic PGY5             History of Present Illness:   29-year-old right-hand-dominant female, with a history of prolapsed mitral valve, presents with 6-day weeks of acute right shoulder pain.   The patient is an avid snowmobiler and dirtbiker, and prior to this episode she would intermittently have pain near the inferior axilla.   "However, 6 weeks ago she woke up one morning with sudden, intense pain in the superior shoulder near the AC joint, radiating down the lateral arm. She cannot recall any precipitating event or trauma.  Her pain was exacerbated by overhead activities, such as reaching in a cabinet for a bowl of cereal, and will be present even at rest and wake her up at night.  It peaked about 3 weeks ago and caused her to seek medical attention with Dr. Mayers at [facility] in on 6/13/19; exam was concerning for rotator cuff pathology and MRI was ordered. She was then referred to [facility] for further evaluation.  She was also advised to rest her shoulder.  Since then, the pain has calmed down over the past 2 weeks and is more tolerable.  Ibuprofen has equivocal effect.  Resting provides most relief.  Never tried shoulder physical therapy or injections.  Now she describes the pain as a soreness, like a muscle cramp or muscle spasm, that occurs while moving, lifting, or carrying anything above shoulder level.  It is still localizes to the anterior superior aspect near the AC joint.  She also thinks her right shoulder has become weaker over the past months.  Occasionally she will have tingling in her bilateral ring and small fingers when she is using the keyboard, but denies any neck pain or radicular pain.         Physical Exam:     EXAMINATION pertinent findings:   VITAL SIGNS: Height 1.575 m (5' 2\"), weight 57.6 kg (127 lb).  Body mass index is 23.23 kg/m .  RESP: non labored breathing   ABD: benign   SKIN: grossly normal   LYMPHATIC: grossly normal   NEURO: grossly normal   VASCULAR: satisfactory perfusion of all extremities   MUSCULOSKELETAL:   Focused examination of the right upper extremity shows no visible muscle atrophy.  Scapular motion is symmetric bilaterally.  Tender to palpation at the bicipital groove, anterior glenohumeral joint line, and posterior superior shoulder.  No tenderness over the " "AC joint, coracoid, and lateral acromion.  Actively and passively, the shoulder forward elevates to 170, abducts to 160, externally rotates to 75, and internally rotates to T12.  She has pain at the anterior superior shoulder with terminal forward elevation, whereas terminal internal and external rotation cause pain in the posterior superior shoulder.  There is a hitch due to pain when she actively lowers her shoulder to 140 degrees forward elevation.    Speeds and Yergason's elicit pain at the anterior superior shoulder, which she describes as \"inside\" the joint.  Posterior superior shoulder pain with Fang.  Negative Neer's.  Pain at the anterior superior shoulder and 4/5 strength with liftoff.  No pain or weakness with belly press and bearhug.  4/5 external rotation giving way due to weakness, otherwise 5/5 strength with rotator cuff testing.   5/5 EPL, FPL, interossei.  Sensation intact light touch in median, radial, ulnar, and axillary nerve distributions. 2+ radial pulse.         Data:   All laboratory data reviewed  All imaging studies reviewed by me    MRI of the right shoulder is notable for a spinoglenoid notch cyst. Labral tear is not directly visible. Does not include fat-saturated images, thus it is difficult to determine if there is fatty infiltration or edema within the rotator cuff musculature.  Overall, rotator cuff muscle bulk seems to be preserved.  The rotator cuff tendons are intact.  Mild edema at the AC joint.      DATA for DOCUMENTATION:         Past Medical History:     Prolapsed mitral valve  Left hip labral tear    No personal history of complications from DVT/PE, bleeding, or anesthesia.    Also see scanned health assessment forms.         Past Surgical History:     Breast fibroadenoma removal in 2011         Social History:   Lives [place] farm with her boyfriend and 6-year-old daughter in [place], [place].  Non-smoker.  Drinks alcohol socially.  Denies illicit drug use.  Owns a " small business/consignment store, which involves intermittent lifting, sorting through bins, and computer work.              Family History:     Family History   Problem Relation Age of Onset     Other Cancer Maternal Grandfather         full body     Other Cancer Paternal Grandmother         ovarian     Diabetes Paternal Grandfather         type 2     Diabetes Niece         type 1            Medications:     Current Outpatient Medications   Medication Sig     levonorgestrel-ethinyl estradiol (AVIANE,ALESSE,LESSINA) 0.1-20 MG-MCG per tablet Take 1 tablet by mouth daily     sulfamethoxazole-trimethoprim (BACTRIM DS/SEPTRA DS) 800-160 MG per tablet Take 1 tablet by mouth 2 times daily     No current facility-administered medications for this visit.             Review of Systems:   A comprehensive 10 point review of systems (constitutional, ENT, cardiac, peripheral vascular, lymphatic, respiratory, GI, , Musculoskeletal, skin, Neurological) was performed and found to be negative except as described in this note.     See intake form completed by patient    Answers for HPI/ROS submitted by the patient on 7/8/2019   General Symptoms: No  Skin Symptoms: Yes  HENT Symptoms: No  EYE SYMPTOMS: No  HEART SYMPTOMS: No  LUNG SYMPTOMS: No  INTESTINAL SYMPTOMS: No  URINARY SYMPTOMS: No  GYNECOLOGIC SYMPTOMS: No  BREAST SYMPTOMS: No  SKELETAL SYMPTOMS: Yes  BLOOD SYMPTOMS: No  NERVOUS SYSTEM SYMPTOMS: No  MENTAL HEALTH SYMPTOMS: No  Sun sensitivity: Yes  Joint pain: Yes

## 2019-07-10 ENCOUNTER — TELEPHONE (OUTPATIENT)
Dept: ORTHOPEDICS | Facility: CLINIC | Age: 29
End: 2019-07-10

## 2019-07-10 NOTE — TELEPHONE ENCOUNTER
Patient is scheduled for surgery with Dr. Resendiz     Spoke or left message with: patient via phone call     Date of Surgery: 7/31/19     Location: [place]     Informed patient they will need an adult  yes    Post-ops made 2 wks and 6 wks with provider     Pre-op with surgeon (if applicable): yes     H&P: Scheduled with PCP     Additional imaging/appointments: n/a     Surgery packet: given in clinic     Additional comments: n/a

## 2019-07-12 ENCOUNTER — MYC MEDICAL ADVICE (OUTPATIENT)
Dept: ORTHOPEDICS | Facility: CLINIC | Age: 29
End: 2019-07-12

## 2019-07-25 ENCOUNTER — OFFICE VISIT (OUTPATIENT)
Dept: FAMILY MEDICINE | Facility: CLINIC | Age: 29
End: 2019-07-25
Payer: MEDICAID

## 2019-07-25 VITALS
HEART RATE: 76 BPM | WEIGHT: 124 LBS | SYSTOLIC BLOOD PRESSURE: 92 MMHG | DIASTOLIC BLOOD PRESSURE: 66 MMHG | TEMPERATURE: 98.5 F | OXYGEN SATURATION: 99 % | BODY MASS INDEX: 22.68 KG/M2

## 2019-07-25 DIAGNOSIS — Z01.818 PREOP GENERAL PHYSICAL EXAM: Primary | ICD-10-CM

## 2019-07-25 DIAGNOSIS — S43.431A SUPERIOR LABRUM ANTERIOR-TO-POSTERIOR (SLAP) TEAR OF RIGHT SHOULDER: ICD-10-CM

## 2019-07-25 LAB
HCG UR QL: NEGATIVE
HGB BLD-MCNC: 14.1 G/DL (ref 11.7–15.7)

## 2019-07-25 PROCEDURE — 85018 HEMOGLOBIN: CPT | Performed by: PHYSICIAN ASSISTANT

## 2019-07-25 PROCEDURE — 81025 URINE PREGNANCY TEST: CPT | Performed by: PHYSICIAN ASSISTANT

## 2019-07-25 PROCEDURE — 36415 COLL VENOUS BLD VENIPUNCTURE: CPT | Performed by: PHYSICIAN ASSISTANT

## 2019-07-25 PROCEDURE — 99214 OFFICE O/P EST MOD 30 MIN: CPT | Performed by: PHYSICIAN ASSISTANT

## 2019-07-25 ASSESSMENT — PAIN SCALES - GENERAL: PAINLEVEL: NO PAIN (0)

## 2019-07-25 NOTE — NURSING NOTE
"Chief Complaint   Patient presents with     Pre-Op Exam       Initial BP 92/66   Pulse 76   Temp 98.5  F (36.9  C) (Tympanic)   Wt 56.2 kg (124 lb)   SpO2 99%   BMI 22.68 kg/m   Estimated body mass index is 22.68 kg/m  as calculated from the following:    Height as of 7/8/19: 1.575 m (5' 2\").    Weight as of this encounter: 56.2 kg (124 lb).  Medication Reconciliation: complete    AJ Pena MA    "

## 2019-07-25 NOTE — PROGRESS NOTES
[facility]  [address] [facility] [address] [phone]  Dept: [phone]    PRE-OP EVALUATION:  Today's date: 2019    Kyara Davis (: 1990) presents for pre-operative evaluation assessment as requested by Matheus Walton.  She requires evaluation and anesthesia risk assessment prior to undergoing surgery/procedure for treatment of right shoulder .    Fax number for surgical facility: [phone]  Primary Physician: Rosalind [facility]  Type of Anesthesia Anticipated: to be determined    Patient has a Health Care Directive or Living Will:  NO    Preop Questions 2019   Who is doing your surgery? Matheus wu   What are you having done? Slap repair   Date of Surgery/Procedure: 2019   Facility or Hospital where procedure/surgery will be performed: [facility]   1.  Do you have a history of Heart attack, stroke, stent, coronary bypass surgery, or other heart surgery? No   2.  Do you ever have any pain or discomfort in your chest? No   3.  Do you have a history of  Heart Failure? No   4.   Are you troubled by shortness of breath when:  walking on a level surface, or up a slight hill, or at night? No   5.  Do you currently have a cold, bronchitis or other respiratory infection? No   6.  Do you have a cough, shortness of breath, or wheezing? No   7.  Do you sometimes get pains in the calves of your legs when you walk? No   8. Do you or anyone in your family have previous history of blood clots? No   9.  Do you or does anyone in your family have a serious bleeding problem such as prolonged bleeding following surgeries or cuts? No   10. Have you ever had problems with anemia or been told to take iron pills? No   11. Have you had any abnormal blood loss such as black, tarry or bloody stools, or abnormal vaginal bleeding? No   12. Have you ever had a blood transfusion? No   13. Have you or any of your relatives ever  had problems with anesthesia? No   14. Do you have sleep apnea, excessive snoring or daytime drowsiness? No   15. Do you have any prosthetic heart valves? No   16. Do you have prosthetic joints? No   17. Is there any chance that you may be pregnant? No         HPI:     HPI related to upcoming procedure: Kyara has right shoulder labral tear and a cyst in the joint that needs to be removed.         MEDICAL HISTORY:     Patient Active Problem List    Diagnosis Date Noted     GERD (gastroesophageal reflux disease) 10/16/2012     Priority: Medium     Leg cramps in pregnancy 07/20/2012     Priority: Medium     Seasonal allergic rhinitis 04/11/2012     Priority: Medium     MVP (mitral valve prolapse) 01/04/2012     Priority: Medium     CARDIOVASCULAR SCREENING; LDL GOAL LESS THAN 160 01/02/2012     Priority: Medium     Migraine 01/02/2012     Priority: Medium     Patient given Migraine Education folder and Migraine Action Plan on January 2, 2012.        Past Medical History:   Diagnosis Date     Coronary artery disease 2001    prolapsed mitral valve     Mitral valve prolapse unknown     Past Surgical History:   Procedure Laterality Date     BIOPSY BREAST      1 month ago     BREAST SURGERY  2011    lump     No current outpatient medications on file.     OTC products: None, except as noted above    Allergies   Allergen Reactions     Vicodin [Hydrocodone-Acetaminophen] Nausea      Latex Allergy: NO    Social History     Tobacco Use     Smoking status: Never Smoker     Smokeless tobacco: Never Used   Substance Use Topics     Alcohol use: Yes     Alcohol/week: 0.0 oz     History   Drug Use No       REVIEW OF SYSTEMS:   Constitutional, neuro, ENT, endocrine, pulmonary, cardiac, gastrointestinal, genitourinary, musculoskeletal, integument and psychiatric systems are negative, except as otherwise noted.    EXAM:   BP 92/66   Pulse 76   Temp 98.5  F (36.9  C) (Tympanic)   Wt 56.2 kg (124 lb)   SpO2 99%   BMI 22.68 kg/m       GENERAL APPEARANCE: healthy, alert and no distress     EYES: EOMI, PERRL     HENT: ear canals and TM's normal and nose and mouth without ulcers or lesions     NECK: no adenopathy, no asymmetry, masses, or scars and thyroid normal to palpation     RESP: lungs clear to auscultation - no rales, rhonchi or wheezes     CV: regular rates and rhythm, normal S1 S2, no S3 or S4 and no murmur, click or rub     ABDOMEN:  soft, nontender, no HSM or masses and bowel sounds normal     MS: extremities normal- no gross deformities noted, no evidence of inflammation in joints, FROM in all extremities.     SKIN: no suspicious lesions or rashes     NEURO: Normal strength and tone, sensory exam grossly normal, mentation intact and speech normal     PSYCH: mentation appears normal. and affect normal/bright     LYMPHATICS: No cervical adenopathy    DIAGNOSTICS:     Results for orders placed or performed in visit on 07/25/19   HCG Qual, Urine ([id number])   Result Value Ref Range    HCG Qual Urine Negative NEG^Negative   Hemoglobin   Result Value Ref Range    Hemoglobin 14.1 11.7 - 15.7 g/dL             IMPRESSION:   Reason for surgery/procedure: Superior labrum anterior ot posterior tear of right shoulder  Diagnosis/reason for consult: preoperative clearance    The proposed surgical procedure is considered LOW risk.    REVISED CARDIAC RISK INDEX  The patient has the following serious cardiovascular risks for perioperative complications such as (MI, PE, VFib and 3  AV Block):  No serious cardiac risks  INTERPRETATION: 0 risks: Class I (very low risk - 0.4% complication rate)    The patient has the following additional risks for perioperative complications:  No identified additional risks      ICD-10-CM    1. Preop general physical exam Z01.818 HCG Qual, Urine ([id number])     Hemoglobin   2. Superior labrum anterior-to-posterior (SLAP) tear of right shoulder S43.431A        RECOMMENDATIONS:         --Patient is to take all scheduled  medications on the day of surgery     APPROVAL GIVEN to proceed with proposed procedure, without further diagnostic evaluation       Signed Electronically by: Kristen M. Kehr, PA-C  Chart reviewed, agree with evaluation and recommendations above.  Nathalia Montgomery M.D.       Copy of this evaluation report is provided to requesting physician.    [facility] Preop Guidelines    Revised Cardiac Risk Index

## 2019-07-29 ENCOUNTER — TELEPHONE (OUTPATIENT)
Dept: FAMILY MEDICINE | Facility: CLINIC | Age: 29
End: 2019-07-29

## 2019-07-29 RX ORDER — OMEGA-3 FATTY ACIDS/FISH OIL 300-1000MG
400 CAPSULE ORAL EVERY 8 HOURS PRN
Status: ON HOLD | COMMUNITY
End: 2019-07-31

## 2019-07-30 ENCOUNTER — ANESTHESIA EVENT (OUTPATIENT)
Dept: SURGERY | Facility: CLINIC | Age: 29
End: 2019-07-30
Payer: MEDICAID

## 2019-07-31 ENCOUNTER — HOSPITAL ENCOUNTER (OUTPATIENT)
Facility: CLINIC | Age: 29
Discharge: HOME | End: 2019-07-31
Attending: ORTHOPAEDIC SURGERY | Admitting: ORTHOPAEDIC SURGERY
Payer: MEDICAID

## 2019-07-31 ENCOUNTER — ANESTHESIA (OUTPATIENT)
Dept: SURGERY | Facility: CLINIC | Age: 29
End: 2019-07-31
Payer: MEDICAID

## 2019-07-31 VITALS
BODY MASS INDEX: 22.46 KG/M2 | RESPIRATION RATE: 16 BRPM | DIASTOLIC BLOOD PRESSURE: 59 MMHG | SYSTOLIC BLOOD PRESSURE: 93 MMHG | WEIGHT: 122.07 LBS | HEART RATE: 71 BPM | TEMPERATURE: 98.6 F | HEIGHT: 62 IN | OXYGEN SATURATION: 96 %

## 2019-07-31 DIAGNOSIS — G89.29 CHRONIC RIGHT SHOULDER PAIN: Primary | ICD-10-CM

## 2019-07-31 DIAGNOSIS — M25.511 CHRONIC RIGHT SHOULDER PAIN: Primary | ICD-10-CM

## 2019-07-31 LAB
GLUCOSE BLDC GLUCOMTR-MCNC: 69 MG/DL (ref 70–99)
GLUCOSE BLDC GLUCOMTR-MCNC: 82 MG/DL (ref 70–99)
HCG UR QL: NEGATIVE

## 2019-07-31 PROCEDURE — 25000128 H RX IP 250 OP 636: Performed by: ORTHOPAEDIC SURGERY

## 2019-07-31 PROCEDURE — 37000008 ZZH ANESTHESIA TECHNICAL FEE, 1ST 30 MIN: Performed by: ORTHOPAEDIC SURGERY

## 2019-07-31 PROCEDURE — 25800030 ZZH RX IP 258 OP 636: Performed by: NURSE ANESTHETIST, CERTIFIED REGISTERED

## 2019-07-31 PROCEDURE — 25000128 H RX IP 250 OP 636: Performed by: ANESTHESIOLOGY

## 2019-07-31 PROCEDURE — 25000132 ZZH RX MED GY IP 250 OP 250 PS 637: Performed by: ANESTHESIOLOGY

## 2019-07-31 PROCEDURE — 25000125 ZZHC RX 250: Performed by: NURSE ANESTHETIST, CERTIFIED REGISTERED

## 2019-07-31 PROCEDURE — 25000125 ZZHC RX 250: Performed by: ANESTHESIOLOGY

## 2019-07-31 PROCEDURE — 37000009 ZZH ANESTHESIA TECHNICAL FEE, EACH ADDTL 15 MIN: Performed by: ORTHOPAEDIC SURGERY

## 2019-07-31 PROCEDURE — 36000057 ZZH SURGERY LEVEL 3 1ST 30 MIN - UMMC: Performed by: ORTHOPAEDIC SURGERY

## 2019-07-31 PROCEDURE — 36000059 ZZH SURGERY LEVEL 3 EA 15 ADDTL MIN UMMC: Performed by: ORTHOPAEDIC SURGERY

## 2019-07-31 PROCEDURE — 27210794 ZZH OR GENERAL SUPPLY STERILE: Performed by: ORTHOPAEDIC SURGERY

## 2019-07-31 PROCEDURE — 82962 GLUCOSE BLOOD TEST: CPT

## 2019-07-31 PROCEDURE — C9290 INJ, BUPIVACAINE LIPOSOME: HCPCS | Performed by: ANESTHESIOLOGY

## 2019-07-31 PROCEDURE — 27110028 ZZH OR GENERAL SUPPLY NON-STERILE: Performed by: ORTHOPAEDIC SURGERY

## 2019-07-31 PROCEDURE — 81025 URINE PREGNANCY TEST: CPT | Performed by: ANESTHESIOLOGY

## 2019-07-31 PROCEDURE — 25000128 H RX IP 250 OP 636: Performed by: NURSE ANESTHETIST, CERTIFIED REGISTERED

## 2019-07-31 PROCEDURE — C1713 ANCHOR/SCREW BN/BN,TIS/BN: HCPCS | Performed by: ORTHOPAEDIC SURGERY

## 2019-07-31 PROCEDURE — 71000027 ZZH RECOVERY PHASE 2 EACH 15 MINS: Performed by: ORTHOPAEDIC SURGERY

## 2019-07-31 PROCEDURE — 40000170 ZZH STATISTIC PRE-PROCEDURE ASSESSMENT II: Performed by: ORTHOPAEDIC SURGERY

## 2019-07-31 DEVICE — IMP SCR ARTHREX 2.4MM BIOCOMPOSITE SUTURETAK AR-1934BCF-24: Type: IMPLANTABLE DEVICE | Site: SHOULDER | Status: FUNCTIONAL

## 2019-07-31 RX ORDER — LIDOCAINE HYDROCHLORIDE 20 MG/ML
INJECTION, SOLUTION INFILTRATION; PERINEURAL PRN
Status: DISCONTINUED | OUTPATIENT
Start: 2019-07-31 | End: 2019-07-31

## 2019-07-31 RX ORDER — ONDANSETRON 4 MG/1
4 TABLET, ORALLY DISINTEGRATING ORAL EVERY 30 MIN PRN
Status: CANCELLED | OUTPATIENT
Start: 2019-07-31

## 2019-07-31 RX ORDER — ONDANSETRON 2 MG/ML
INJECTION INTRAMUSCULAR; INTRAVENOUS PRN
Status: DISCONTINUED | OUTPATIENT
Start: 2019-07-31 | End: 2019-07-31

## 2019-07-31 RX ORDER — SODIUM CHLORIDE, SODIUM LACTATE, POTASSIUM CHLORIDE, CALCIUM CHLORIDE 600; 310; 30; 20 MG/100ML; MG/100ML; MG/100ML; MG/100ML
INJECTION, SOLUTION INTRAVENOUS CONTINUOUS PRN
Status: DISCONTINUED | OUTPATIENT
Start: 2019-07-31 | End: 2019-07-31

## 2019-07-31 RX ORDER — IBUPROFEN 800 MG/1
800 TABLET, FILM COATED ORAL
Qty: 42 TABLET | Refills: 0 | Status: SHIPPED | OUTPATIENT
Start: 2019-07-31 | End: 2019-08-19

## 2019-07-31 RX ORDER — PROPOFOL 10 MG/ML
INJECTION, EMULSION INTRAVENOUS CONTINUOUS PRN
Status: DISCONTINUED | OUTPATIENT
Start: 2019-07-31 | End: 2019-07-31

## 2019-07-31 RX ORDER — NALOXONE HYDROCHLORIDE 0.4 MG/ML
.1-.4 INJECTION, SOLUTION INTRAMUSCULAR; INTRAVENOUS; SUBCUTANEOUS
Status: DISCONTINUED | OUTPATIENT
Start: 2019-07-31 | End: 2019-07-31 | Stop reason: HOSPADM

## 2019-07-31 RX ORDER — ONDANSETRON 2 MG/ML
4 INJECTION INTRAMUSCULAR; INTRAVENOUS EVERY 30 MIN PRN
Status: CANCELLED | OUTPATIENT
Start: 2019-07-31

## 2019-07-31 RX ORDER — BUPIVACAINE HYDROCHLORIDE AND EPINEPHRINE 5; 5 MG/ML; UG/ML
INJECTION, SOLUTION PERINEURAL PRN
Status: DISCONTINUED | OUTPATIENT
Start: 2019-07-31 | End: 2019-07-31

## 2019-07-31 RX ORDER — GABAPENTIN 300 MG/1
300 CAPSULE ORAL AT BEDTIME
Qty: 30 CAPSULE | Refills: 0 | Status: SHIPPED | OUTPATIENT
Start: 2019-07-31 | End: 2019-08-19

## 2019-07-31 RX ORDER — FENTANYL CITRATE 50 UG/ML
25-50 INJECTION, SOLUTION INTRAMUSCULAR; INTRAVENOUS
Status: DISCONTINUED | OUTPATIENT
Start: 2019-07-31 | End: 2019-07-31 | Stop reason: HOSPADM

## 2019-07-31 RX ORDER — HYDROXYZINE PAMOATE 25 MG/1
25 CAPSULE ORAL 3 TIMES DAILY PRN
Qty: 30 CAPSULE | Refills: 0 | Status: SHIPPED | OUTPATIENT
Start: 2019-07-31 | End: 2019-08-19

## 2019-07-31 RX ORDER — FENTANYL CITRATE 50 UG/ML
25-50 INJECTION, SOLUTION INTRAMUSCULAR; INTRAVENOUS
Status: CANCELLED | OUTPATIENT
Start: 2019-07-31

## 2019-07-31 RX ORDER — FENTANYL CITRATE 50 UG/ML
INJECTION, SOLUTION INTRAMUSCULAR; INTRAVENOUS PRN
Status: DISCONTINUED | OUTPATIENT
Start: 2019-07-31 | End: 2019-07-31

## 2019-07-31 RX ORDER — CEFAZOLIN SODIUM 2 G/100ML
2 INJECTION, SOLUTION INTRAVENOUS
Status: COMPLETED | OUTPATIENT
Start: 2019-07-31 | End: 2019-07-31

## 2019-07-31 RX ORDER — FLUMAZENIL 0.1 MG/ML
0.2 INJECTION, SOLUTION INTRAVENOUS
Status: DISCONTINUED | OUTPATIENT
Start: 2019-07-31 | End: 2019-07-31 | Stop reason: HOSPADM

## 2019-07-31 RX ORDER — SODIUM CHLORIDE, SODIUM LACTATE, POTASSIUM CHLORIDE, CALCIUM CHLORIDE 600; 310; 30; 20 MG/100ML; MG/100ML; MG/100ML; MG/100ML
INJECTION, SOLUTION INTRAVENOUS CONTINUOUS
Status: CANCELLED | OUTPATIENT
Start: 2019-07-31

## 2019-07-31 RX ORDER — GABAPENTIN 100 MG/1
300 CAPSULE ORAL ONCE
Status: COMPLETED | OUTPATIENT
Start: 2019-07-31 | End: 2019-07-31

## 2019-07-31 RX ORDER — PROPOFOL 10 MG/ML
INJECTION, EMULSION INTRAVENOUS PRN
Status: DISCONTINUED | OUTPATIENT
Start: 2019-07-31 | End: 2019-07-31

## 2019-07-31 RX ORDER — OXYCODONE HYDROCHLORIDE 5 MG/1
5 TABLET ORAL EVERY 6 HOURS PRN
Qty: 40 TABLET | Refills: 0 | Status: SHIPPED | OUTPATIENT
Start: 2019-07-31 | End: 2019-08-15

## 2019-07-31 RX ORDER — MEPERIDINE HYDROCHLORIDE 25 MG/ML
12.5 INJECTION INTRAMUSCULAR; INTRAVENOUS; SUBCUTANEOUS
Status: CANCELLED | OUTPATIENT
Start: 2019-07-31

## 2019-07-31 RX ORDER — CEFAZOLIN SODIUM 1 G/3ML
1 INJECTION, POWDER, FOR SOLUTION INTRAMUSCULAR; INTRAVENOUS SEE ADMIN INSTRUCTIONS
Status: DISCONTINUED | OUTPATIENT
Start: 2019-07-31 | End: 2019-07-31 | Stop reason: HOSPADM

## 2019-07-31 RX ORDER — HYDROMORPHONE HYDROCHLORIDE 1 MG/ML
.3-.5 INJECTION, SOLUTION INTRAMUSCULAR; INTRAVENOUS; SUBCUTANEOUS EVERY 10 MIN PRN
Status: CANCELLED | OUTPATIENT
Start: 2019-07-31

## 2019-07-31 RX ORDER — OXYCODONE HYDROCHLORIDE 5 MG/1
5 TABLET ORAL EVERY 4 HOURS PRN
Status: CANCELLED | OUTPATIENT
Start: 2019-07-31

## 2019-07-31 RX ORDER — NALOXONE HYDROCHLORIDE 0.4 MG/ML
.1-.4 INJECTION, SOLUTION INTRAMUSCULAR; INTRAVENOUS; SUBCUTANEOUS
Status: CANCELLED | OUTPATIENT
Start: 2019-07-31 | End: 2019-08-01

## 2019-07-31 RX ORDER — ACETAMINOPHEN 325 MG/1
975 TABLET ORAL ONCE
Status: COMPLETED | OUTPATIENT
Start: 2019-07-31 | End: 2019-07-31

## 2019-07-31 RX ADMIN — SODIUM CHLORIDE, POTASSIUM CHLORIDE, SODIUM LACTATE AND CALCIUM CHLORIDE: 600; 310; 30; 20 INJECTION, SOLUTION INTRAVENOUS at 09:46

## 2019-07-31 RX ADMIN — CEFAZOLIN SODIUM 2 G: 2 INJECTION, SOLUTION INTRAVENOUS at 10:09

## 2019-07-31 RX ADMIN — LIDOCAINE HYDROCHLORIDE 55 MG: 20 INJECTION, SOLUTION INFILTRATION; PERINEURAL at 09:55

## 2019-07-31 RX ADMIN — BUPIVACAINE HYDROCHLORIDE AND EPINEPHRINE BITARTRATE 10 ML: 5; .005 INJECTION, SOLUTION EPIDURAL; INTRACAUDAL; PERINEURAL at 09:15

## 2019-07-31 RX ADMIN — PHENYLEPHRINE HYDROCHLORIDE: 10 INJECTION INTRAVENOUS at 11:05

## 2019-07-31 RX ADMIN — PHENYLEPHRINE HYDROCHLORIDE 0.3 MCG/KG/MIN: 10 INJECTION INTRAVENOUS at 10:09

## 2019-07-31 RX ADMIN — MIDAZOLAM 2 MG: 1 INJECTION INTRAMUSCULAR; INTRAVENOUS at 09:09

## 2019-07-31 RX ADMIN — PROPOFOL 30 MG: 10 INJECTION, EMULSION INTRAVENOUS at 10:00

## 2019-07-31 RX ADMIN — FENTANYL CITRATE 50 MCG: 50 INJECTION, SOLUTION INTRAMUSCULAR; INTRAVENOUS at 10:53

## 2019-07-31 RX ADMIN — ONDANSETRON 4 MG: 2 INJECTION INTRAMUSCULAR; INTRAVENOUS at 11:10

## 2019-07-31 RX ADMIN — MIDAZOLAM 1 MG: 1 INJECTION INTRAMUSCULAR; INTRAVENOUS at 09:55

## 2019-07-31 RX ADMIN — PROPOFOL 100 MG: 10 INJECTION, EMULSION INTRAVENOUS at 09:55

## 2019-07-31 RX ADMIN — ACETAMINOPHEN 975 MG: 325 TABLET, FILM COATED ORAL at 07:56

## 2019-07-31 RX ADMIN — MIDAZOLAM 1 MG: 1 INJECTION INTRAMUSCULAR; INTRAVENOUS at 10:48

## 2019-07-31 RX ADMIN — GABAPENTIN 300 MG: 300 CAPSULE ORAL at 07:55

## 2019-07-31 RX ADMIN — SODIUM CHLORIDE, POTASSIUM CHLORIDE, SODIUM LACTATE AND CALCIUM CHLORIDE: 600; 310; 30; 20 INJECTION, SOLUTION INTRAVENOUS at 10:57

## 2019-07-31 RX ADMIN — FENTANYL CITRATE 50 MCG: 50 INJECTION INTRAMUSCULAR; INTRAVENOUS at 09:09

## 2019-07-31 RX ADMIN — BUPIVACAINE 10 ML: 13.3 INJECTION, SUSPENSION, LIPOSOMAL INFILTRATION at 09:15

## 2019-07-31 RX ADMIN — PROPOFOL 125 MCG/KG/MIN: 10 INJECTION, EMULSION INTRAVENOUS at 09:55

## 2019-07-31 ASSESSMENT — MIFFLIN-ST. JEOR: SCORE: 1227.08

## 2019-07-31 NOTE — OR NURSING
Patient scheduled for left shoulder surgery, Order states Right shoulder. Will have Dr Resendiz complete consent with correct side.

## 2019-07-31 NOTE — ANESTHESIA PROCEDURE NOTES
Peripheral Nerve Block Procedure Note    Staff:     Anesthesiologist:  Rosario Cope MD  Location: Pre-op  Procedure Start/Stop TImes:      7/31/2019 9:10 AM     7/31/2019 9:21 AM    patient identified, IV checked, site marked, risks and benefits discussed, informed consent, monitors and equipment checked, pre-op evaluation, at physician/surgeon's request and post-op pain management      Correct Patient: Yes      Correct Position: Yes      Correct Site: Yes      Correct Procedure: Yes      Correct Laterality:  Yes    Site Marked:  Yes  Procedure details:     Procedure:  Interscalene    Laterality:  Right    Position:  Sitting    Sterile Prep: chloraprep, mask and sterile gloves      Local skin infiltration:  None    Needle:  Insulated and short bevel    Needle gauge:  21    Needle length (inches):  4    Ultrasound: Yes      Ultrasound used to identify targeted nerve, plexus, or vascular structure and placed a needle adjacent to it      Permanent Image entered into patiient's record      Abnormal pain on injection: No      Blood Aspirated: No      Paresthesias:  No    Bleeding at site: No      Bolus via:  Needle    Infusion Method:  Single Shot    Complications:  None  Assessment/Narrative:     Injection made incrementally with aspirations every (mL):  5

## 2019-07-31 NOTE — ANESTHESIA PREPROCEDURE EVALUATION
Anesthesia Pre-Procedure Evaluation    Patient: Kyara Davis   MRN:     [id number] Gender:   female   Age:    29 year old :      1990        Preoperative Diagnosis: Tear Of Left Glenoid Labrum   Procedure(s):  Left Shoulder Labrum Anterior/Posterior, Arthroscopic Paralabral Cyst Removal     Past Medical History:   Diagnosis Date     Coronary artery disease     prolapsed mitral valve     Mitral valve prolapse unknown      Past Surgical History:   Procedure Laterality Date     BIOPSY BREAST      1 month ago     BREAST SURGERY  2011    lump          Anesthesia Evaluation     .             ROS/MED HX    ENT/Pulmonary:       Neurologic:       Cardiovascular:        (-) CAD   METS/Exercise Tolerance:     Hematologic:         Musculoskeletal:         GI/Hepatic:         Renal/Genitourinary:         Endo:         Psychiatric:         Infectious Disease:         Malignancy:         Other:                     J[facility] AN PHYSICAL EXAM    LABS:  CBC:   Lab Results   Component Value Date    WBC 8.2 2016    WBC 12.4 (H) 2012    HGB 14.1 2019    HGB 13.1 2016    HCT 40.8 2016    HCT 34.3 (L) 2012     2016     2012     BMP:   Lab Results   Component Value Date     2011    POTASSIUM 3.5 2011    CHLORIDE 108 2011    CO2 23 2011    BUN 15 2011    CR 0.70 2011    GLC 82 2016    GLC 87 2011     COAGS:   Lab Results   Component Value Date    PTT 26 2011    INR 0.90 2011     POC:   Lab Results   Component Value Date    BGM 69 (L) 2019    HCG Negative 2019     OTHER:   Lab Results   Component Value Date    OMID 9.1 2011    TSH 1.06 2016        Preop Vitals    BP Readings from Last 3 Encounters:   19 101/79   19 92/66   19 111/73    Pulse Readings from Last 3 Encounters:   19 77   19 76   17 75      Resp Readings from Last 3 Encounters:  "  07/31/19 16   03/26/15 16   02/01/11 18    SpO2 Readings from Last 3 Encounters:   07/25/19 99%   05/19/17 97%   03/25/16 98%      Temp Readings from Last 1 Encounters:   07/31/19 36.7  C (98.1  F) (Oral)    Ht Readings from Last 1 Encounters:   07/31/19 1.575 m (5' 2.01\")      Wt Readings from Last 1 Encounters:   07/31/19 55.4 kg (122 lb 1.1 oz)    Estimated body mass index is 22.32 kg/m  as calculated from the following:    Height as of this encounter: 1.575 m (5' 2.01\").    Weight as of this encounter: 55.4 kg (122 lb 1.1 oz).     LDA:        Assessment:   ASA SCORE: 2    H&P: History and physical reviewed and following examination; no interval change.   Smoking Status:  Non-Smoker/Unknown   NPO Status: NPO Appropriate     Plan:   Anes. Type:  MAC   Pre-Medication: None   Induction:  IV (Standard)   Airway: Native Airway   Access/Monitoring: PIV   Maintenance: Propofol Sedation     Postop Plan:   Postop Pain: None  Postop Sedation/Airway: Not planned     PONV Management:   Adult Risk Factors: Female, Non-Smoker   Prevention: Ondansetron, Propofol     CONSENT: Direct conversation   Plan and risks discussed with: Patient   Blood Products: Consented (ALL Blood Products)                   Rosario Cope MD  "

## 2019-07-31 NOTE — OP NOTE
"Procedure Date: 07/31/2019      PREOPERATIVE DIAGNOSES:  Right shoulder paralabral cyst with superior labral tear.      POSTOPERATIVE DIAGNOSES:  Right shoulder paralabral cyst with superior labral tear.      SURGICAL PROCEDURES:   1.  Right shoulder arthroscopic decompression of paralabral cyst with extensive arthroscopic debridement of glenohumeral joint,   2.  Right shoulder arthroscopic superior labral tear repair.      SURGEON:  Matheus Resendiz MD      ESTIMATED BLOOD LOSS:  Less than 25 mL.      IMPLANTS: Arthrex 2.4 mm BioComposite SutureTak anchor x 2.      INDICATIONS:  Ms. Davis is a very pleasant 29-year-old woman with history of pain and disability in her shoulder.  Her preoperative evaluation was consistent with the above.  After discussion of risks and benefits of surgical versus nonsurgical management, she elected to proceed with surgical remediation.      DESCRIPTION OF PROCEDURE:  The patient was positively identified in the preanesthesia care area.  Surgical site was initialed by me and her consent was reviewed with her.  She was then taken to the operating theater.  She was placed in a well-padded beachchair position, prepped and draped in the usual sterile fashion for right upper extremity surgery.      Prior to surgical initiation, a \"time out\" was held in accordance with hospital policy.  Verbal verification of delivery of prophylactic intravenous antibiotics was performed.      After establishment of a posterior viewing portal, an internal portal was made under direct visualization.  Diagnostic arthroscopy was then possible with findings as follows:  The upper portion of the subscapularis was intact.  The biceps was torn off its superior labrum with a freely mobile superior labral tear.  The biceps origin was frayed, but not detached.  The pouch was synovitic, but devoid of loose bodies.  Posterior cuff was intact.  Superior cuff had some undersurface partial-thickness rotator cuff " fraying.  There was significant synovitis and injection on the anterior inferior labrum as well.      I placed an arthroscopic shaver in through the low anterior cannula and debrided the anterior, superior and posterior belinda.  I debrided the articular surface of humerus and the undersurface of the rotator cuff.  After this extensive arthroscopic debridement of the glenohumeral joint, I turned my attention to the superior labral tear repair.  I placed anterior superolateral portal through the rotator interval and used a liberator elevator to mobilize the labral tear until I saw a globule of viscous, nonbloody fluid.  I was able to debride this.  I used a shaver with its internal oscillating portion removed as a curet to further decompress this paralabral cyst.  I then used the shaver in its standard fashion to repair the footprint until I could see cortical bone. Through the port of [place], I placed the posterior superior followed by the superior 2.4 mm BioComposite SutureTak anchor and passed these each in a simple fashion using a 25-degree to the left suture lasso.  I tied these sequentially effecting excellent reapproximation of the entirety of the labrum back to its prepared bed.  All instruments were removed.  Closure was with 3-0 interrupted Monocryl sutures.  Steri-Strips and a sterile nonadherent dressing were applied.  A sling was placed.      POSTOPERATIVE PLAN:   1.  The patient will be discharged home today on oral analgesics.  She should have no active or passive range of motion at this time, except for Codman's.   2.  In 2 weeks, she will begin supine gravity eliminated forward elevation 0 to unlimited and passive external rotation at the side 0 to unlimited.   3.  At the 6-week krupa, her sling will be discontinued and she will begin gentle progressive 4-quadrant stretching.   4.  At 3 months, she will have unrestrictive 4-quadrant stretching, periscapular stabilization and strengthening.   Radiographs will also be obtained at that visit.         RAMILA ZAYAS MD             D: 2019   T: 2019   MT: SHIRLEY      Name:     AMRIAM TRAYLOR   MRN:      [id number]        Account:        [id number]   :      1990           Procedure Date: 2019      Document: [id number]

## 2019-07-31 NOTE — DISCHARGE INSTRUCTIONS
Same-Day Surgery   Adult Discharge Orders & Instructions     For 24 hours after surgery:  1. Get plenty of rest.  A responsible adult must stay with you for at least 24 hours after you leave the hospital.   2. Pain medication can slow your reflexes. Do not drive or use heavy equipment.  If you have weakness or tingling, don't drive or use heavy equipment until this feeling goes away.  3. Mixing alcohol and pain medication can cause dizziness and slow your breathing. It can even be fatal. Do not drink alcohol while taking pain medication.  4. Avoid strenuous or risky activities.  Ask for help when climbing stairs.   5. You may feel lightheaded.  If so, sit for a few minutes before standing.  Have someone help you get up.   6. If you have nausea (feel sick to your stomach), drink only clear liquids such as apple juice, ginger ale, broth or 7-Up.  Rest may also help.  Be sure to drink enough fluids.  Move to a regular diet as you feel able. Take pain medications with a small amount of solid food, such as toast or crackers, to avoid nausea.   7. A slight fever is normal. Call the doctor if your fever is over 100 F (37.7 C) (taken under the tongue) or lasts longer than 24 hours.  8. You may have a dry mouth, muscle aches, trouble sleeping or a sore throat.  These symptoms should go away after 24 hours.  9. Do not make important or legal decisions.   Pain Management:      1. Take pain medication (if prescribed) for pain as directed by your physician.        2. WARNING: If the pain medication you have been prescribed contains Tylenol  (acetaminophen), DO NOT take additional doses of Tylenol (acetaminophen).     Call your doctor for any of the followin.  Signs of infection (fever, growing tenderness at the surgery site, severe pain, a large amount of drainage or bleeding, foul-smelling drainage, redness, swelling).    2.  It has been over 8 to 10 hours since surgery and you are still not able to urinate (pee).    3.   "Headache for over 24 hours.    4.  Numbness, tingling or weakness the day after surgery (if you had spinal anesthesia).  To contact a doctor, call _______Dr Resendiz [phone]_______ or:      [phone] and ask for the Resident On Call for:          _______Ortho_____ (answered 24 hours a day)      Emergency Department:  [facility]: [phone]  [place] [facility]: [phone]               Rev. 10/2014   Information about Liposomal bupivacaine (Exparel)    What is Liposomal bupivacaine?    Exparel is a numbing medication that can help you manage your pain after surgery.  This medication is similar to \"novacaine,\" which is often used by the dentist.  Exparel is released slowly and can help control pain for up to 72 hours.    What is the purpose of Liposomal bupivacaine ?    To manage your pain after surgery    To help you sleep better, take deep breaths, walk more comfortable, and feel up to visiting with others    How is the procedure done?    Liposomal bupivacaine medication by an injection.    It is usually given while you are under sedation right before your surgery.  If this is the case, you will be awake, but you should not experience any pain during the procedure.    For some people, the injection may be given at the very end of you surgery.  It all depends on the type of surgery and your situation.    The procedure usually takes about 15 minutes.  An ultrasound machine will help the anesthesiologist insert it in the right place.    A needle is used to place the numbing medication under your skin.  It provides pain relief by numbing the tissue in the area where your surgeon will make the incision.    What can I expect?    You may experience numbness, tingling, or a feeling of heaviness around the area that was injected.    The anesthesia team will check on you every day, for 3 days while you are in the hospital.    Let your nurse or anesthesia team know if you " experience:       Numbness or tingling occurs in areas other than around the tubing     Blurry vision    Ringing in your ears    A metallic taste in your mouth    If you go home before the end of the 3 days, and experience any of the above symptoms, IMMEDIATELY CALL YOUR ANESTHESIOLOGIST:      Call: Dial [phone].or call [phone]  Let the  know why you are calling and ask them to contact the anesthesiologist right away for you.    You should not receive any other type of numbing medication within 4 days after receiving Liposomal bupivacaine unless your anesthesiologist approves.                Exp 9/2016          .

## 2019-07-31 NOTE — ANESTHESIA CARE TRANSFER NOTE
Patient: Kyara Davis    Procedure(s):  Right Shoulder Labrum Anterior/Posterior, Arthroscopic Paralabral Cyst Removal    Diagnosis: Tear Of Left Glenoid Labrum  Diagnosis Additional Information: No value filed.    Anesthesia Type:   MAC     Note:  Airway :Blow-by  Patient transferred to:Phase II  Handoff Report: Identifed the Patient, Identified the Reponsible Provider, Reviewed the pertinent medical history, Discussed the surgical course, Reviewed Intra-OP anesthesia mangement and issues during anesthesia, Set expectations for post-procedure period and Allowed opportunity for questions and acknowledgement of understanding      Vitals: (Last set prior to Anesthesia Care Transfer)    CRNA VITALS  7/31/2019 1052 - 7/31/2019 1129      7/31/2019             NIBP:  99/39  (Abnormal)     Pulse:  56    NIBP Mean:  51    SpO2:  98 %                Electronically Signed By: KELLEY Teague CRNA  July 31, 2019  11:29 AM

## 2019-08-06 ENCOUNTER — TELEPHONE (OUTPATIENT)
Dept: ORTHOPEDICS | Facility: CLINIC | Age: 29
End: 2019-08-06

## 2019-08-06 NOTE — TELEPHONE ENCOUNTER
Patient is status post arthroscopic decompression of a paralabral cyst and superior labral tear repair 07/31/19.  She reports minimal pain.  Her question was concerning which medications to stop.  She was informed she can stop the Oxycodone which she was only taking at night and continue to take Ibuprofen or Acetaminophen as needed during the day.  She will discontinue Gabapentin when she no longer needs it for discomfort during the night. The patient is agreeable with the plan.

## 2019-08-13 ENCOUNTER — TELEPHONE (OUTPATIENT)
Dept: ORTHOPEDICS | Facility: CLINIC | Age: 29
End: 2019-08-13

## 2019-08-13 NOTE — TELEPHONE ENCOUNTER
Health Call Center    Phone Message    May a detailed message be left on voicemail: no    Reason for Call: Other: Pt is returning a call from Marion Brown regarding her post-op pain. Please call her back.     Action Taken: Message routed to:  [facility] ([facility]): [facility]

## 2019-08-14 NOTE — TELEPHONE ENCOUNTER
Patient is status post right shoulder paralabral cyst removal with superior labral repair 07/31/19.  She reports she had minimal pain last week, taking Oxycodone only at bedtime. Patient stated pain started when she was reaching with her left arm across the seat in her truck. Since Sunday she states pain has increased when she is moving around.  She has been applying ice frequently to the shoulder and taking Ibuprofen and Oxycodone during the day.  She reports she has been removing the sling only for showering and dressing.    Patient was informed she should avoid any lifting or carrying with the right arm.  She can remove the sling when awake, with the arm resting on a pillow.  Elbow, wrist, and hand range of motion exercises were encouraged.  She reports she will continue with Ibuprofen and ice application.   She will call if she needs a refill of the narcotic.  She is agreeable with the plan.

## 2019-08-15 DIAGNOSIS — M25.511 CHRONIC RIGHT SHOULDER PAIN: ICD-10-CM

## 2019-08-15 DIAGNOSIS — G89.29 CHRONIC RIGHT SHOULDER PAIN: Primary | ICD-10-CM

## 2019-08-15 DIAGNOSIS — M25.511 CHRONIC RIGHT SHOULDER PAIN: Primary | ICD-10-CM

## 2019-08-15 DIAGNOSIS — G89.29 CHRONIC RIGHT SHOULDER PAIN: ICD-10-CM

## 2019-08-15 RX ORDER — OXYCODONE HYDROCHLORIDE 5 MG/1
5 TABLET ORAL EVERY 6 HOURS PRN
Qty: 40 TABLET | Refills: 0 | Status: SHIPPED | OUTPATIENT
Start: 2019-08-15 | End: 2019-08-19

## 2019-08-15 NOTE — TELEPHONE ENCOUNTER
Refilled per standing order.  Surg. Done 7/31/19.  Will see if she wants it mailed to her or will  rx.

## 2019-08-19 ENCOUNTER — TELEPHONE (OUTPATIENT)
Dept: ORTHOPEDICS | Facility: CLINIC | Age: 29
End: 2019-08-19

## 2019-08-19 ENCOUNTER — OFFICE VISIT (OUTPATIENT)
Dept: ORTHOPEDICS | Facility: CLINIC | Age: 29
End: 2019-08-19
Payer: MEDICAID

## 2019-08-19 DIAGNOSIS — S43.432A TEAR OF LEFT GLENOID LABRUM, INITIAL ENCOUNTER: Primary | ICD-10-CM

## 2019-08-19 DIAGNOSIS — G89.29 CHRONIC RIGHT SHOULDER PAIN: ICD-10-CM

## 2019-08-19 DIAGNOSIS — M25.511 CHRONIC RIGHT SHOULDER PAIN: ICD-10-CM

## 2019-08-19 RX ORDER — IBUPROFEN 800 MG/1
800 TABLET, FILM COATED ORAL
Qty: 42 TABLET | Refills: 0 | Status: SHIPPED | OUTPATIENT
Start: 2019-08-19 | End: 2023-08-03

## 2019-08-19 RX ORDER — HYDROXYZINE PAMOATE 25 MG/1
25 CAPSULE ORAL 3 TIMES DAILY PRN
Qty: 30 CAPSULE | Refills: 0 | Status: SHIPPED | OUTPATIENT
Start: 2019-08-19 | End: 2022-10-13

## 2019-08-19 RX ORDER — OXYCODONE HYDROCHLORIDE 5 MG/1
5 TABLET ORAL EVERY 6 HOURS PRN
Qty: 20 TABLET | Refills: 0 | Status: SHIPPED | OUTPATIENT
Start: 2019-08-19 | End: 2022-10-13

## 2019-08-19 RX ORDER — GABAPENTIN 300 MG/1
300 CAPSULE ORAL AT BEDTIME
Qty: 30 CAPSULE | Refills: 0 | Status: SHIPPED | OUTPATIENT
Start: 2019-08-19 | End: 2022-10-13

## 2019-08-19 NOTE — PROGRESS NOTES
S:  Doing well.Back on pain medications over last week but was off PO week #2.    O:  Incision clean, dry, and intact  Sets Deltoid  Wiggles fingers  Warm and well-perfused hand with palpable pulse    A/P:  Doing well  Advance per op report  I refilled pain medications.

## 2019-08-19 NOTE — TELEPHONE ENCOUNTER
Health Call Center    Phone Message    May a detailed message be left on voicemail: yes    Reason for Call: Medication Question or concern regarding medication   Prescription Clarification  Name of Medication: hydrOXYzine (VISTARIL) 25 MG capsule  Prescribing Provider: Dr Resendiz   Pharmacy:      [id number] [place] [facility] [address]     What on the order needs clarification? Pharmacy calling to say that Vistaril is off the market and want to know if they can change to Atarax? Please call pharmacy to discuss further.          Action Taken: Message routed to:  [facility] ([facility]): Orthopedics

## 2019-08-19 NOTE — NURSING NOTE
Reason For Visit:   Chief Complaint   Patient presents with     Surgical Followup     2 wk post-op Right shoulder Superior labrum anterior posterior, Arthroscopic paralabral cyst removal 7/31/19         PCP: Nathalia Montgomery     ?  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  Work status?  Full time.  Date of injury: None  Date of surgery: 7/31/19  Type of surgery:   1.  Right shoulder arthroscopic decompression of paralabral cyst with extensive arthroscopic debridement of glenohumeral joint,   2.  Right shoulder arthroscopic superior labral tear repair.   Smoker: No     Right hand dominant    SANE score  Affected shoulder: Right  Right shoulder SANE: 10  Left shoulder SANE: 100    There were no vitals taken for this visit.      Pain Assessment  Patient Currently in Pain: Yes  0-10 Pain Scale: 3  Primary Pain Location: Shoulder(Right)  Pain Descriptors: Aching  Alleviating Factors: Ice, Pain medication, Other (comment)(Brace, laying on a U pillow)  Aggravating Factors: Other (comment)(couging sneeing, crouching to the left)    Jayashree Frederick, ATC

## 2019-08-19 NOTE — TELEPHONE ENCOUNTER
RN spoke to pharmacist Miko and notify that Dr. Resendiz approved of the change of medication from Vistaril to Atarax.    Diann Love RN

## 2019-08-19 NOTE — LETTER
8/19/2019       RE: Kyara Davis  [id number] [facility] [address]     Dear Colleague,    Thank you for referring your patient, Kyara Davis, to the [facility] at [facility]. Please see a copy of my visit note below.    S:  Doing well.Back on pain medications over last week but was off PO week #2.    O:  Incision clean, dry, and intact  Sets Deltoid  Wiggles fingers  Warm and well-perfused hand with palpable pulse    A/P:  Doing well  Advance per op report  I refilled pain medications.    Again, thank you for allowing me to participate in the care of your patient.      Sincerely,    Matheus Resendiz MD

## 2019-08-29 ENCOUNTER — THERAPY VISIT (OUTPATIENT)
Dept: PHYSICAL THERAPY | Facility: CLINIC | Age: 29
End: 2019-08-29
Payer: MEDICAID

## 2019-08-29 DIAGNOSIS — M25.511 CHRONIC RIGHT SHOULDER PAIN: ICD-10-CM

## 2019-08-29 DIAGNOSIS — Z47.89 AFTERCARE FOLLOWING SURGERY OF THE MUSCULOSKELETAL SYSTEM: ICD-10-CM

## 2019-08-29 DIAGNOSIS — G89.29 CHRONIC RIGHT SHOULDER PAIN: ICD-10-CM

## 2019-08-29 PROCEDURE — 97110 THERAPEUTIC EXERCISES: CPT | Mod: GP | Performed by: PHYSICAL THERAPIST

## 2019-08-29 PROCEDURE — 97161 PT EVAL LOW COMPLEX 20 MIN: CPT | Mod: GP | Performed by: PHYSICAL THERAPIST

## 2019-08-29 PROCEDURE — 97530 THERAPEUTIC ACTIVITIES: CPT | Mod: GP | Performed by: PHYSICAL THERAPIST

## 2019-08-29 NOTE — LETTER
DEPARTMENT OF HEALTH AND HUMAN SERVICES  CENTERS FOR MEDICARE & MEDICAID SERVICES    PLAN/UPDATED PLAN OF PROGRESS FOR OUTPATIENT REHABILITATION    PATIENTS NAME:  Kyara Davis   : 1990  PROVIDER NUMBER:    [id number]  [id number]  PROVIDER NAME: DASH SANCHEZ  MEDICAL RECORD NUMBER: [id number]   START OF CARE DATE:  SOC Date: 19   TYPE:  PT  PRIMARY/TREATMENT DIAGNOSIS: (Pertinent Medical Diagnosis)  Chronic right shoulder pain  Aftercare following surgery of the musculoskeletal system  VISITS FROM START OF CARE:  Rxs Used: 1     [facility] Initial Evaluation  Subjective:  The history is provided by the patient. No  was used.   Kyara Davis being seen for Post op right shoulder.   Problem began 2019 (pt reports she is unsure of when her shoulder pain started, had a pinched suprascapular nerve by a cyst, waking up in pain which prompted the surgery.). Where condition occurred: for unknown reasons.Problem occurred: Not sure  and reported as 7/10 (aching pain) on pain scale. General health as reported by patient is good. Pertinent medical history includes:  Numbness/tingling and pain at night/rest.    Surgeries include:  Orthopedic surgery and other. Other surgery history details: Lump removal.  Current medications:  Pain medication.   Primary job tasks include:  Computer work, lifting/carrying, prolonged sitting, prolonged standing, pushing/pulling and repetitive tasks.  Pain is described as aching, cramping, sharp and shooting and is intermittent. Pain is worse during the day. Since onset symptoms are unchanged. Special tests:  MRI and x-ray. Previous treatment includes surgery. There was none improvement following previous treatment.   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 Restrictions include:  Working in normal job with restrictions.  Barriers include:  Requires assistance  "with ADL's and stairs.  Red flags:  None as reported by patient.  Better when laying reclined, worse with walking around, pt states sling is uncomfortable. Pt is right handed, increased pain when trying to work on her phone.  Goes by \"[employer]\"                   Objective:  Standing Alignment:    Shoulder/UE:  Rounded shoulders (pt arrived wearing R sling with will in place, R upper extremity shrugged.)      Shoulder Evaluation:  ROM:  AROM:    Flexion:  Left:  150*      Abduction:  Left: 150*     Internal Rotation:  Left:  T6      External Rotation:  Left:  80*        PATIENTS NAME:  Kyara Davis   : 1990      PROM:    Flexion:  Right: 80*    External Rotation:  Right:  40*    Assessment/Plan:    Patient is a 29 year old female with right side shoulder complaints.    Patient has the following significant findings with corresponding treatment plan.                Diagnosis 1:  Right Shoulder Decompression post op and superior labral repair  Pain -  hot/cold therapy, manual therapy, self management, education and home program  Decreased ROM/flexibility - manual therapy, therapeutic exercise and therapeutic activity  Impaired posture - neuro re-education, therapeutic activities and home program  Previous and current functional limitations:  (See Goal Flow Sheet for this information)    Short term and Long term goals: (See Goal Flow Sheet for this information)   Communication ability:  Patient appears to be able to clearly communicate and understand verbal and written communication and follow directions correctly.  Treatment Explanation - The following has been discussed with the patient:   RX ordered/plan of care  Anticipated outcomes  Possible risks and side effects  This patient would benefit from PT intervention to resume normal activities.   Rehab potential is good.  Frequency:  2 X week, once daily  Duration:  for 8 weeks  Discharge Plan:  Achieve all LTG.  Independent in home treatment program.  Reach " "maximal therapeutic benefit.  Please refer to the daily flowsheet for treatment today, total treatment time and time spent performing 1:1 timed codes.         Caregiver Signature/Credentials _____________________________ Date ________      Treating Provider: Odalys Leung, PT, DPT   I have reviewed and certified the need for these services and plan of treatment while under my care.        PHYSICIAN'S SIGNATURE:   _________________________________________  Date___________   Matheus Resendiz M.D  Certification period:  Beginning of Cert date period: 08/29/19 to  End of Cert period date: 11/21/19     Functional Level Progress Report: Please see attached \"Goal Flow sheet for Functional level.\"    ____X____ Continue Services or       ________ DC Services                Service dates: From  SOC Date: 08/29/19 date to present                         "

## 2019-08-29 NOTE — PROGRESS NOTES
"[facility] Initial Evaluation  Subjective:  The history is provided by the patient. No  was used.   Kyara Davis being seen for Post op right shoulder.   Problem began 7/31/2019 (pt reports she is unsure of when her shoulder pain started, had a pinched suprascapular nerve by a cyst, waking up in pain which prompted the surgery.). Where condition occurred: for unknown reasons.Problem occurred: Not sure  and reported as 7/10 (aching pain) on pain scale. General health as reported by patient is good. Pertinent medical history includes:  Numbness/tingling and pain at night/rest.    Surgeries include:  Orthopedic surgery and other. Other surgery history details: Lump removal.  Current medications:  Pain medication.   Primary job tasks include:  Computer work, lifting/carrying, prolonged sitting, prolonged standing, pushing/pulling and repetitive tasks.  Pain is described as aching, cramping, sharp and shooting and is intermittent. Pain is worse during the day. Since onset symptoms are unchanged. Special tests:  MRI and x-ray. Previous treatment includes surgery. There was none improvement following previous treatment.   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 Restrictions include:  Working in normal job with restrictions.    Barriers include:  Requires assistance with ADL's and stairs.  Red flags:  None as reported by patient.    Better when laying reclined, worse with walking around, pt states sling is uncomfortable. Pt is right handed, increased pain when trying to work on her phone.    Goes by \"Devang\"                     Objective:  Standing Alignment:      Shoulder/UE:  Rounded shoulders (pt arrived wearing R sling with will in place, R upper extremity shrugged.)                                       Shoulder Evaluation:  ROM:  AROM:    Flexion:  Left:  150*        Abduction:  Left: 150*       Internal " Rotation:  Left:  T6      External Rotation:  Left:  80*                    PROM:    Flexion:  Right: 80*            External Rotation:  Right:  40*                                                               General     ROS    Assessment/Plan:    Patient is a 29 year old female with right side shoulder complaints.    Patient has the following significant findings with corresponding treatment plan.                Diagnosis 1:  Right Shoulder Decompression post op and superior labral repair  Pain -  hot/cold therapy, manual therapy, self management, education and home program  Decreased ROM/flexibility - manual therapy, therapeutic exercise and therapeutic activity  Impaired posture - neuro re-education, therapeutic activities and home program      Previous and current functional limitations:  (See Goal Flow Sheet for this information)    Short term and Long term goals: (See Goal Flow Sheet for this information)     Communication ability:  Patient appears to be able to clearly communicate and understand verbal and written communication and follow directions correctly.  Treatment Explanation - The following has been discussed with the patient:   RX ordered/plan of care  Anticipated outcomes  Possible risks and side effects  This patient would benefit from PT intervention to resume normal activities.   Rehab potential is good.    Frequency:  2 X week, once daily  Duration:  for 8 weeks  Discharge Plan:  Achieve all LTG.  Independent in home treatment program.  Reach maximal therapeutic benefit.    Please refer to the daily flowsheet for treatment today, total treatment time and time spent performing 1:1 timed codes.

## 2019-09-06 ENCOUNTER — THERAPY VISIT (OUTPATIENT)
Dept: PHYSICAL THERAPY | Facility: CLINIC | Age: 29
End: 2019-09-06
Payer: COMMERCIAL

## 2019-09-06 DIAGNOSIS — Z47.89 AFTERCARE FOLLOWING SURGERY OF THE MUSCULOSKELETAL SYSTEM: ICD-10-CM

## 2019-09-06 PROCEDURE — 97110 THERAPEUTIC EXERCISES: CPT | Mod: GP | Performed by: PHYSICAL THERAPIST

## 2019-09-10 ENCOUNTER — THERAPY VISIT (OUTPATIENT)
Dept: PHYSICAL THERAPY | Facility: CLINIC | Age: 29
End: 2019-09-10
Payer: COMMERCIAL

## 2019-09-10 DIAGNOSIS — Z47.89 AFTERCARE FOLLOWING SURGERY OF THE MUSCULOSKELETAL SYSTEM: ICD-10-CM

## 2019-09-10 PROCEDURE — 97110 THERAPEUTIC EXERCISES: CPT | Mod: GP | Performed by: PHYSICAL THERAPY ASSISTANT

## 2019-09-11 DIAGNOSIS — S43.432A TEAR OF LEFT GLENOID LABRUM, INITIAL ENCOUNTER: Primary | ICD-10-CM

## 2019-09-13 ENCOUNTER — THERAPY VISIT (OUTPATIENT)
Dept: PHYSICAL THERAPY | Facility: CLINIC | Age: 29
End: 2019-09-13
Payer: COMMERCIAL

## 2019-09-13 DIAGNOSIS — S43.432A TEAR OF LEFT GLENOID LABRUM, INITIAL ENCOUNTER: ICD-10-CM

## 2019-09-13 DIAGNOSIS — Z47.89 AFTERCARE FOLLOWING SURGERY OF THE MUSCULOSKELETAL SYSTEM: ICD-10-CM

## 2019-09-13 PROCEDURE — 97110 THERAPEUTIC EXERCISES: CPT | Mod: GP | Performed by: PHYSICAL THERAPY ASSISTANT

## 2019-09-13 NOTE — PROGRESS NOTES
PROGRESS  REPORT    Progress reporting period is from 8/29/2019 to 9/13/2019.       SUBJECTIVE  Subjective changes noted by patient: Pt states right arm gets sore in evening but wears her sling/ABD pillow daily.     Current pain level is 2/10.     Previous pain level was  6/10  .   Changes in function:  Yes (See Goal flowsheet attached for changes in current functional level)  Adverse reaction to treatment or activity: None    OBJECTIVE  Changes noted in objective findings: 6 wks post-op. PROM right shoulder flexion 115, ER 50 (+).      ASSESSMENT/PLAN  Updated problem list and treatment plan: Diagnosis 1:  Right labral repair, decompression, cyst removal from the suprascapular nerve.  Pain -  hot/cold therapy, splint/taping/bracing/orthotics, self management and home program  Decreased ROM/flexibility - manual therapy, therapeutic exercise and home program  Decreased strength - therapeutic exercise, therapeutic activities and home program  Inflammation - cold therapy and self management/home program  Decreased function - therapeutic activities and home program  STG/LTGs have been met or progress has been made towards goals:  Yes (See Goal flow sheet completed today.)  Assessment of Progress: The patient's condition has potential to improve.  Self Management Plans:  Patient has been instructed in a home treatment program.  Patient  has been instructed in self management of symptoms.  I have re-evaluated this patient and find that the nature, scope, duration and intensity of the therapy is appropriate for the medical condition of the patient.  Kyara continues to require the following intervention to meet STG and LTG's:  PT    Recommendations:  This patient would benefit from continued therapy.     Frequency:  2 X week, once daily  Duration:  for 4 weeks, then 1-2 X week for 4 weeks.      The progress note summary was written in collaboration with and reviewed by the physical therapist.    Please refer to the daily  flowsheet for treatment today, total treatment time and time spent performing 1:1 timed codes.

## 2019-09-16 ENCOUNTER — OFFICE VISIT (OUTPATIENT)
Dept: ORTHOPEDICS | Facility: CLINIC | Age: 29
End: 2019-09-16
Payer: COMMERCIAL

## 2019-09-16 DIAGNOSIS — S43.432A TEAR OF LEFT GLENOID LABRUM, INITIAL ENCOUNTER: Primary | ICD-10-CM

## 2019-09-16 NOTE — LETTER
9/16/2019       RE: Kyara Davis  [address] [facility] [address]     Dear Colleague,    Thank you for referring your patient, Kyara Davis, to the [facility] at [facility]. Please see a copy of my visit note below.    S:  Doing well.    O:  Incision clean, dry, and intact  Sets Deltoid  Wiggles fingers  Warm and well-perfused hand with palpable pulse    A/P:  Doing well  Advance per op report    Again, thank you for allowing me to participate in the care of your patient.      Sincerely,    Matheus Resendiz MD

## 2019-09-16 NOTE — NURSING NOTE
Reason For Visit:   Chief Complaint   Patient presents with     Surgical Followup     DOS 7/31/19 S/PRiyadielt shoulder Superior labrum anterior posterior, Arthroscopic paralabral cyst removal      PCP: Nathalia Montgomery     ?  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  Date of injury: None  Date of surgery: 7/31/19  Type of surgery:   1.  Right shoulder arthroscopic decompression of paralabral cyst with extensive arthroscopic debridement of glenohumeral joint,   2.  Right shoulder arthroscopic superior labral tear repair.   Smoker: No     Right hand dominant      SANE score  Affected shoulder: Right   Right shoulder SANE: 10 with restrictions  Left shoulder SANE: 100       Pain Assessment  Patient Currently in Pain: Yes  0-10 Pain Scale: 3  Primary Pain Location: Shoulder  Pain Descriptors: Discomfort    Chloé Merritt LPN

## 2019-09-17 ENCOUNTER — THERAPY VISIT (OUTPATIENT)
Dept: PHYSICAL THERAPY | Facility: CLINIC | Age: 29
End: 2019-09-17
Payer: COMMERCIAL

## 2019-09-17 DIAGNOSIS — Z47.89 AFTERCARE FOLLOWING SURGERY OF THE MUSCULOSKELETAL SYSTEM: ICD-10-CM

## 2019-09-17 PROCEDURE — 97110 THERAPEUTIC EXERCISES: CPT | Mod: GP | Performed by: PHYSICAL THERAPY ASSISTANT

## 2019-09-28 ENCOUNTER — THERAPY VISIT (OUTPATIENT)
Dept: PHYSICAL THERAPY | Facility: CLINIC | Age: 29
End: 2019-09-28
Payer: COMMERCIAL

## 2019-09-28 DIAGNOSIS — Z47.89 AFTERCARE FOLLOWING SURGERY OF THE MUSCULOSKELETAL SYSTEM: ICD-10-CM

## 2019-09-28 PROCEDURE — 97110 THERAPEUTIC EXERCISES: CPT | Mod: GP | Performed by: PHYSICAL THERAPIST

## 2019-09-30 ENCOUNTER — THERAPY VISIT (OUTPATIENT)
Dept: PHYSICAL THERAPY | Facility: CLINIC | Age: 29
End: 2019-09-30
Payer: COMMERCIAL

## 2019-09-30 DIAGNOSIS — Z47.89 AFTERCARE FOLLOWING SURGERY OF THE MUSCULOSKELETAL SYSTEM: ICD-10-CM

## 2019-09-30 PROCEDURE — 97110 THERAPEUTIC EXERCISES: CPT | Mod: GP | Performed by: PHYSICAL THERAPY ASSISTANT

## 2019-10-07 ENCOUNTER — THERAPY VISIT (OUTPATIENT)
Dept: PHYSICAL THERAPY | Facility: CLINIC | Age: 29
End: 2019-10-07
Payer: COMMERCIAL

## 2019-10-07 DIAGNOSIS — Z47.89 AFTERCARE FOLLOWING SURGERY OF THE MUSCULOSKELETAL SYSTEM: ICD-10-CM

## 2019-10-07 PROCEDURE — 97110 THERAPEUTIC EXERCISES: CPT | Mod: GP | Performed by: PHYSICAL THERAPY ASSISTANT

## 2019-10-14 ENCOUNTER — THERAPY VISIT (OUTPATIENT)
Dept: PHYSICAL THERAPY | Facility: CLINIC | Age: 29
End: 2019-10-14
Payer: COMMERCIAL

## 2019-10-14 DIAGNOSIS — M25.511 CHRONIC RIGHT SHOULDER PAIN: Primary | ICD-10-CM

## 2019-10-14 DIAGNOSIS — G89.29 CHRONIC RIGHT SHOULDER PAIN: Primary | ICD-10-CM

## 2019-10-14 DIAGNOSIS — Z47.89 AFTERCARE FOLLOWING SURGERY OF THE MUSCULOSKELETAL SYSTEM: ICD-10-CM

## 2019-10-14 PROCEDURE — 97110 THERAPEUTIC EXERCISES: CPT | Mod: GP | Performed by: PHYSICAL THERAPIST

## 2019-10-14 NOTE — PROGRESS NOTES
Assessment/Plan:    PROGRESS  REPORT    Progress reporting period is from 8/29/2019 to 10/14/2019.       SUBJECTIVE  Subjective changes noted by patient:  Pt overall feels that her arm is 40% improved since PT post op.  Subjective: pt states she arrived late today because of her daughter getting ready for the bus. pt reports she finally has some time to do her exercises and did them 3 times in one day. pt reports worse pain at when sleeping.     Current pain level is 0/10  .     Previous pain level was  7/10 Initial Pain level: 0/10.   Changes in function:  Yes (See Goal flowsheet attached for changes in current functional level)  Adverse reaction to treatment or activity: None    OBJECTIVE  Changes noted in objective findings:  Yes,   Objective: L5 IR on the RUE PROM. 50* ER RUE PROM. 130* flexion PROM     ASSESSMENT/PLAN  Updated problem list and treatment plan: Diagnosis 1:  Post op R shoulder  Pain -  manual therapy, self management, education and home program  Decreased ROM/flexibility - manual therapy and therapeutic exercise  Decreased joint mobility - manual therapy and therapeutic exercise  Decreased strength - therapeutic exercise and therapeutic activities  STG/LTGs have been met or progress has been made towards goals:  Yes (See Goal flow sheet completed today.)  Assessment of Progress: The patient's condition is improving.  Self Management Plans:  Patient has been instructed in a home treatment program.  I have re-evaluated this patient and find that the nature, scope, duration and intensity of the therapy is appropriate for the medical condition of the patient.  Kyara continues to require the following intervention to meet STG and LTG's:  PT    Recommendations:  This patient would benefit from continued therapy pending MD approval to progress to strengthening.     Frequency:  1 X week, once daily  Duration:  for 8 weeks        Please refer to the daily flowsheet for treatment today, total  treatment time and time spent performing 1:1 timed codes.

## 2019-10-21 ENCOUNTER — ANCILLARY PROCEDURE (OUTPATIENT)
Dept: GENERAL RADIOLOGY | Facility: CLINIC | Age: 29
End: 2019-10-21
Attending: ORTHOPAEDIC SURGERY
Payer: COMMERCIAL

## 2019-10-21 ENCOUNTER — OFFICE VISIT (OUTPATIENT)
Dept: ORTHOPEDICS | Facility: CLINIC | Age: 29
End: 2019-10-21
Payer: COMMERCIAL

## 2019-10-21 DIAGNOSIS — M25.511 CHRONIC RIGHT SHOULDER PAIN: ICD-10-CM

## 2019-10-21 DIAGNOSIS — M25.511 CHRONIC RIGHT SHOULDER PAIN: Primary | ICD-10-CM

## 2019-10-21 DIAGNOSIS — G89.29 CHRONIC RIGHT SHOULDER PAIN: ICD-10-CM

## 2019-10-21 DIAGNOSIS — G89.29 CHRONIC RIGHT SHOULDER PAIN: Primary | ICD-10-CM

## 2019-10-21 RX ORDER — CIPROFLOXACIN 500 MG/1
TABLET, FILM COATED ORAL
Refills: 0 | COMMUNITY
Start: 2019-10-03 | End: 2022-10-13

## 2019-10-21 NOTE — PROGRESS NOTES
S:  Doing well.    O:  Incision clean, dry, and intact  Sets Deltoid  Wiggles fingers  Warm and well-perfused hand with palpable pulse  115/35/L2    Anteroinferior capsular contracture    XR: normal    A/P:  Doing well  Advance per op report  I demonstrated 4 quadrant stretching.  F/U in 3 mos for clinical recheck.

## 2019-10-21 NOTE — NURSING NOTE
Reason For Visit:   Chief Complaint   Patient presents with     Surgical Followup     3 month post-op Right Superior labrum anterior posterior, Arthroscopic paralabral cyst removal 7/31/19         PCP: Nathalia Montgomery     ?  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  Work status?  Full time.  Date of injury: None  Date of surgery: 7/31/19  Type of surgery:   1.  Right shoulder arthroscopic decompression of paralabral cyst with extensive arthroscopic debridement of glenohumeral joint,   2.  Right shoulder arthroscopic superior labral tear repair.   Smoker: No     Right hand dominant    SANE score  Affected shoulder: Right  Right shoulder SANE: 60  Left shoulder SANE: 100    There were no vitals taken for this visit.      Pain Assessment  Patient Currently in Pain: Bartolo Frederick, ATC

## 2019-10-21 NOTE — LETTER
10/21/2019       RE: Kyara Davis  [id number] [facility] [address]     Dear Colleague,    Thank you for referring your patient, Kyara Davis, to the [facility] at [facility]. Please see a copy of my visit note below.    S:  Doing well.    O:  Incision clean, dry, and intact  Sets Deltoid  Wiggles fingers  Warm and well-perfused hand with palpable pulse  115/35/L2    Anteroinferior capsular contracture    XR: normal    A/P:  Doing well  Advance per op report  I demonstrated 4 quadrant stretching.  F/U in 3 mos for clinical recheck.    Again, thank you for allowing me to participate in the care of your patient.      Sincerely,    Matheus Resendiz MD

## 2019-11-11 ENCOUNTER — THERAPY VISIT (OUTPATIENT)
Dept: PHYSICAL THERAPY | Facility: CLINIC | Age: 29
End: 2019-11-11
Payer: COMMERCIAL

## 2019-11-11 DIAGNOSIS — Z47.89 AFTERCARE FOLLOWING SURGERY OF THE MUSCULOSKELETAL SYSTEM: ICD-10-CM

## 2019-11-11 PROCEDURE — 97140 MANUAL THERAPY 1/> REGIONS: CPT | Mod: GP | Performed by: PHYSICAL THERAPIST

## 2019-11-11 PROCEDURE — 97110 THERAPEUTIC EXERCISES: CPT | Mod: GP | Performed by: PHYSICAL THERAPIST

## 2019-11-11 NOTE — PROGRESS NOTES
SUBJECTIVE  Subjective: Saw her surgeon and he felt her ROM should be farther along.  Would like her to go twice a week to PT for more aggressive ROM   Current Pain level: 0/10   Changes in function:  Yes (See Goal flowsheet attached for changes in current functional level)     Adverse reaction to treatment or activity:  None    OBJECTIVE  Objective: AROM/PROM R shoulder flex: 118/132 ABD: 87/120 ER: 48/54 IR: T12/50 (in supine)      ASSESSMENT  Kyara continues to require intervention to meet STG and LTG's: PT  Patient is progressing slower than expected  Response to therapy has shown an improvement in  pain level and ROM   Progress made towards STG/LTG?  Yes (See Goal flowsheet attached for updates on achievement of STG and LTG)    PLAN  Current treatment program is being advanced to more complex exercises.    PTA/ATC plan:  N/A    Please refer to the daily flowsheet for treatment today, total treatment time and time spent performing 1:1 timed codes.

## 2019-11-14 ENCOUNTER — THERAPY VISIT (OUTPATIENT)
Dept: PHYSICAL THERAPY | Facility: CLINIC | Age: 29
End: 2019-11-14
Payer: COMMERCIAL

## 2019-11-14 DIAGNOSIS — Z47.89 AFTERCARE FOLLOWING SURGERY OF THE MUSCULOSKELETAL SYSTEM: ICD-10-CM

## 2019-11-14 PROCEDURE — 97110 THERAPEUTIC EXERCISES: CPT | Mod: GP | Performed by: PHYSICAL THERAPIST

## 2019-11-14 PROCEDURE — 97140 MANUAL THERAPY 1/> REGIONS: CPT | Mod: GP | Performed by: PHYSICAL THERAPIST

## 2019-11-18 ENCOUNTER — THERAPY VISIT (OUTPATIENT)
Dept: PHYSICAL THERAPY | Facility: CLINIC | Age: 29
End: 2019-11-18
Payer: COMMERCIAL

## 2019-11-18 DIAGNOSIS — Z47.89 AFTERCARE FOLLOWING SURGERY OF THE MUSCULOSKELETAL SYSTEM: ICD-10-CM

## 2019-11-18 PROCEDURE — 97140 MANUAL THERAPY 1/> REGIONS: CPT | Mod: GP | Performed by: PHYSICAL THERAPY ASSISTANT

## 2019-11-18 PROCEDURE — 97110 THERAPEUTIC EXERCISES: CPT | Mod: GP | Performed by: PHYSICAL THERAPY ASSISTANT

## 2019-11-21 ENCOUNTER — THERAPY VISIT (OUTPATIENT)
Dept: PHYSICAL THERAPY | Facility: CLINIC | Age: 29
End: 2019-11-21
Payer: COMMERCIAL

## 2019-11-21 DIAGNOSIS — Z47.89 AFTERCARE FOLLOWING SURGERY OF THE MUSCULOSKELETAL SYSTEM: ICD-10-CM

## 2019-11-21 PROCEDURE — 97140 MANUAL THERAPY 1/> REGIONS: CPT | Mod: GP | Performed by: PHYSICAL THERAPY ASSISTANT

## 2019-11-21 PROCEDURE — 97110 THERAPEUTIC EXERCISES: CPT | Mod: GP | Performed by: PHYSICAL THERAPY ASSISTANT

## 2019-11-21 NOTE — PROGRESS NOTES
PROGRESS  REPORT    Progress reporting period is from 8/29/2019 to 11/21/2019.       SUBJECTIVE  Subjective changes noted by patient:  Pt ran 6 miles last night and the last mile of her run the right bicep/tricep muscles were sore. Overall, she feels the right arm is moving more mostly after she has PT sessions.  Right upper trap muscle also sore today. She wonders if she held/gaurded the right arm while running.     Current pain level is  1/10.     Previous pain level was 2/10.   Changes in function:  Yes (See Goal flowsheet attached for changes in current functional level)  Adverse reaction to treatment or activity: activity - see above    OBJECTIVE  Changes noted in objective findings:  PROM right shoulder flexion 150, Abduction 140, ER 66.      ASSESSMENT/PLAN  Updated problem list and treatment plan: Diagnosis 1:  Right SLAP repair, SAD   Pain -  hot/cold therapy, manual therapy, self management, education and home program  Decreased ROM/flexibility - manual therapy, therapeutic exercise, therapeutic activity and home program  Decreased strength - therapeutic exercise, therapeutic activities and home program  Decreased function - therapeutic activities and home program  STG/LTGs have been met or progress has been made towards goals:  Yes (See Goal flow sheet completed today.)  Assessment of Progress: The patient's condition has potential to improve.  Patient is meeting short term goals and is progressing towards long term goals.  Self Management Plans:  Patient has been instructed in a home treatment program.  Patient  has been instructed in self management of symptoms.  I have re-evaluated this patient and find that the nature, scope, duration and intensity of the therapy is appropriate for the medical condition of the patient.  Kyara continues to require the following intervention to meet STG and LTG's:  PT    Recommendations:  This patient would benefit from continued therapy.     Frequency:  2 X week,  once daily  Duration:  for 3 weeks tapering to 1 X a week over 4 weeks    Surgeon follow-up visit is set for Jan 20, 2020.    The progress note summary was written in collaboration with and reviewed by the physical therapist.    Please refer to the daily flowsheet for treatment today, total treatment time and time spent performing 1:1 timed codes.

## 2019-11-21 NOTE — LETTER
DEPARTMENT OF HEALTH AND HUMAN SERVICES  CENTERS FOR MEDICARE & MEDICAID SERVICES    PLAN/UPDATED PLAN OF PROGRESS FOR OUTPATIENT REHABILITATION    PATIENTS NAME:  Kyara Davis   : 1990  PROVIDER NUMBER:    [id number]  [id number]   PROVIDER NAME: DASH BROOKS [facility]  MEDICAL RECORD NUMBER: [id number]   START OF CARE DATE:  SOC Date: 19   TYPE:  PT  PRIMARY/TREATMENT DIAGNOSIS: (Pertinent Medical Diagnosis)  Aftercare following surgery of the musculoskeletal system  VISITS FROM START OF CARE:  Rxs Used: 13 (PTA 1)      PROGRESS  REPORT  Progress reporting period is from 2019 to 2019.     SUBJECTIVE  Subjective changes noted by patient:  Pt ran 6 miles last night and the last mile of her run the right bicep/tricep muscles were sore. Overall, she feels the right arm is moving more mostly after she has PT sessions.  Right upper trap muscle also sore today. She wonders if she held/gaurded the right arm while running.     Current pain level is  1/10.     Previous pain level was 2/10.   Changes in function:  Yes (See Goal flowsheet attached for changes in current functional level)  Adverse reaction to treatment or activity: activity - see above  OBJECTIVE  Changes noted in objective findings:  PROM right shoulder flexion 150, Abduction 140, ER 66.    ASSESSMENT/PLAN  Updated problem list and treatment plan: Diagnosis 1:  Right SLAP repair, SAD   Pain -  hot/cold therapy, manual therapy, self management, education and home program  Decreased ROM/flexibility - manual therapy, therapeutic exercise, therapeutic activity and home program  Decreased strength - therapeutic exercise, therapeutic activities and home program  Decreased function - therapeutic activities and home program  STG/LTGs have been met or progress has been made towards goals:  Yes (See Goal flow sheet completed today.)  Assessment of Progress: The patient's condition has potential to improve.  Patient is meeting short term  "goals and is progressing towards long term goals.  Self Management Plans:  Patient has been instructed in a home treatment program.  Patient  has been instructed in self management of symptoms.  I have re-evaluated this patient and find that the nature, scope, duration and intensity of the therapy is appropriate for the medical condition of the patient.  Kyara continues to require the following intervention to meet STG and LTG's:  PT    Recommendations:  This patient would benefit from continued therapy.     Frequency:  2 X week, once daily  Duration:  for 3 weeks tapering to 1 X a week over 4 weeks  Surgeon follow-up visit is set for Jan 20, 2020.    The progress note summary was written in collaboration with and reviewed by the physical therapist.    Please refer to the daily flowsheet for treatment today, total treatment time and time spent performing 1:1 timed codes.          Caregiver Signature/Credentials _____________________________ Date ________       Treating Provider: Shailesh Blackburn PTA    I have reviewed and certified the need for these services and plan of treatment while under my care.        PHYSICIAN'S SIGNATURE:   _________________________________________  Date___________   Matheus Resendiz    Certification period:  Beginning of Cert date period: 11/21/19 to  End of Cert period date: 02/19/20     Functional Level Progress Report: Please see attached \"Goal Flow sheet for Functional level.\"    ____X____ Continue Services or       ________ DC Services                Service dates: From  SOC Date: 08/29/19 date to present                         "

## 2019-11-25 ENCOUNTER — THERAPY VISIT (OUTPATIENT)
Dept: PHYSICAL THERAPY | Facility: CLINIC | Age: 29
End: 2019-11-25
Payer: COMMERCIAL

## 2019-11-25 DIAGNOSIS — Z47.89 AFTERCARE FOLLOWING SURGERY OF THE MUSCULOSKELETAL SYSTEM: ICD-10-CM

## 2019-11-25 PROCEDURE — 97140 MANUAL THERAPY 1/> REGIONS: CPT | Mod: GP | Performed by: PHYSICAL THERAPIST

## 2019-11-25 PROCEDURE — 97110 THERAPEUTIC EXERCISES: CPT | Mod: GP | Performed by: PHYSICAL THERAPIST

## 2019-12-02 ENCOUNTER — THERAPY VISIT (OUTPATIENT)
Dept: PHYSICAL THERAPY | Facility: CLINIC | Age: 29
End: 2019-12-02
Payer: COMMERCIAL

## 2019-12-02 DIAGNOSIS — Z47.89 AFTERCARE FOLLOWING SURGERY OF THE MUSCULOSKELETAL SYSTEM: ICD-10-CM

## 2019-12-02 PROCEDURE — 97140 MANUAL THERAPY 1/> REGIONS: CPT | Mod: GP | Performed by: PHYSICAL THERAPIST

## 2019-12-02 PROCEDURE — 97110 THERAPEUTIC EXERCISES: CPT | Mod: GP | Performed by: PHYSICAL THERAPIST

## 2019-12-02 NOTE — PROGRESS NOTES
SUBJECTIVE  Subjective: Went for a short snowmobile ride yesterday.  Knows that wasn't a good plan and now that she is sore she really knows it   Current Pain level: 1/10   Changes in function:  Yes (See Goal flowsheet attached for changes in current functional level)     Adverse reaction to treatment or activity:  None    OBJECTIVE  Objective: AROM R shoulder flex: 145, ABD: 135, ER: 65, IR: T10     ASSESSMENT  Kyara continues to require intervention to meet STG and LTG's: PT  Patient is progressing as expected.  Response to therapy has shown an improvement in  pain level and ROM   Progress made towards STG/LTG?  Yes (See Goal flowsheet attached for updates on achievement of STG and LTG)    PLAN  Current treatment program is being advanced to more complex exercises.    PTA/ATC plan:  N/A    Please refer to the daily flowsheet for treatment today, total treatment time and time spent performing 1:1 timed codes.

## 2019-12-05 ENCOUNTER — THERAPY VISIT (OUTPATIENT)
Dept: PHYSICAL THERAPY | Facility: CLINIC | Age: 29
End: 2019-12-05
Payer: COMMERCIAL

## 2019-12-05 DIAGNOSIS — Z47.89 AFTERCARE FOLLOWING SURGERY OF THE MUSCULOSKELETAL SYSTEM: ICD-10-CM

## 2019-12-05 PROCEDURE — 97110 THERAPEUTIC EXERCISES: CPT | Mod: GP | Performed by: PHYSICAL THERAPIST

## 2019-12-05 PROCEDURE — 97140 MANUAL THERAPY 1/> REGIONS: CPT | Mod: GP | Performed by: PHYSICAL THERAPIST

## 2019-12-09 ENCOUNTER — THERAPY VISIT (OUTPATIENT)
Dept: PHYSICAL THERAPY | Facility: CLINIC | Age: 29
End: 2019-12-09
Payer: COMMERCIAL

## 2019-12-09 DIAGNOSIS — Z47.89 AFTERCARE FOLLOWING SURGERY OF THE MUSCULOSKELETAL SYSTEM: ICD-10-CM

## 2019-12-09 PROCEDURE — 97140 MANUAL THERAPY 1/> REGIONS: CPT | Mod: GP | Performed by: PHYSICAL THERAPY ASSISTANT

## 2019-12-09 PROCEDURE — 97110 THERAPEUTIC EXERCISES: CPT | Mod: GP | Performed by: PHYSICAL THERAPY ASSISTANT

## 2019-12-09 NOTE — PROGRESS NOTES
SUBJECTIVE  Subjective changes as noted by pt: Pt was sore in the R arm after the last PT visit.  She is able to do her exercises daily though and no reported concerns, placing R arm into/out of jacket feels better.    Current pain level:  2/10   Changes in function:  Yes (See Goal flowsheet attached for changes in current functional level)     Adverse reaction to treatment or activity:  None    OBJECTIVE  Changes in objective findings:  AROM right shoulder flexion 148, , ER 65, E/IR T10.      ASSESSMENT  Kyara continues to require intervention to meet STG and LTG's: PT  Patient's symptoms are resolving.  Patient is progressing as expected.  Response to therapy has shown an improvement in  ROM  and function  Progress made towards STG/LTG?  STG met, LTG not fully met.     PLAN  Continue current treatment plan until patient demonstrates readiness to progress to higher level exercises.    PTA/ATC plan:  Will continue with present plan of care.    Please refer to the daily flowsheet for treatment today, total treatment time and time spent performing 1:1 timed codes.

## 2019-12-12 ENCOUNTER — THERAPY VISIT (OUTPATIENT)
Dept: PHYSICAL THERAPY | Facility: CLINIC | Age: 29
End: 2019-12-12
Payer: COMMERCIAL

## 2019-12-12 DIAGNOSIS — Z47.89 AFTERCARE FOLLOWING SURGERY OF THE MUSCULOSKELETAL SYSTEM: ICD-10-CM

## 2019-12-12 PROCEDURE — 97140 MANUAL THERAPY 1/> REGIONS: CPT | Mod: GP | Performed by: PHYSICAL THERAPIST

## 2019-12-12 PROCEDURE — 97110 THERAPEUTIC EXERCISES: CPT | Mod: GP | Performed by: PHYSICAL THERAPIST

## 2019-12-17 ENCOUNTER — THERAPY VISIT (OUTPATIENT)
Dept: PHYSICAL THERAPY | Facility: CLINIC | Age: 29
End: 2019-12-17
Payer: COMMERCIAL

## 2019-12-17 DIAGNOSIS — Z47.89 AFTERCARE FOLLOWING SURGERY OF THE MUSCULOSKELETAL SYSTEM: ICD-10-CM

## 2019-12-17 PROCEDURE — 97110 THERAPEUTIC EXERCISES: CPT | Mod: GP | Performed by: PHYSICAL THERAPIST

## 2019-12-17 PROCEDURE — 97140 MANUAL THERAPY 1/> REGIONS: CPT | Mod: GP | Performed by: PHYSICAL THERAPIST

## 2019-12-27 ENCOUNTER — OFFICE VISIT (OUTPATIENT)
Dept: URGENT CARE | Facility: URGENT CARE | Age: 29
End: 2019-12-27
Payer: COMMERCIAL

## 2019-12-27 VITALS
HEART RATE: 91 BPM | DIASTOLIC BLOOD PRESSURE: 69 MMHG | TEMPERATURE: 98.5 F | OXYGEN SATURATION: 99 % | SYSTOLIC BLOOD PRESSURE: 106 MMHG

## 2019-12-27 DIAGNOSIS — L30.8 OTHER ECZEMA: Primary | ICD-10-CM

## 2019-12-27 PROCEDURE — 99213 OFFICE O/P EST LOW 20 MIN: CPT | Performed by: NURSE PRACTITIONER

## 2019-12-27 RX ORDER — TRIAMCINOLONE ACETONIDE 5 MG/G
1 CREAM TOPICAL 2 TIMES DAILY
Qty: 15 G | Refills: 0 | Status: SHIPPED | OUTPATIENT
Start: 2019-12-27 | End: 2023-08-03

## 2019-12-27 ASSESSMENT — ENCOUNTER SYMPTOMS
CONSTITUTIONAL NEGATIVE: 1
EYES NEGATIVE: 1
NEUROLOGICAL NEGATIVE: 1
RESPIRATORY NEGATIVE: 1
CARDIOVASCULAR NEGATIVE: 1
ROS SKIN COMMENTS: ECZEMA

## 2019-12-27 NOTE — PATIENT INSTRUCTIONS
Patient Education     Atopic Dermatitis (Adult)  Atopic dermatitis is a dry, itchy, red rash. It s also called eczema. The rash is chronic, or ongoing. It can come and go over time. The disease is often passed down in families. It causes a problem with the skin barrier that makes the skin more sensitive to the environment and other factors. The increased skin sensitivity causes an itch, which causes scratching. Scratching can worsen the itching or also break the skin. This can put the skin at risk of infection.  The condition is most common in people with asthma, hay fever, hives, or dry or sensitive skin. The rash may be caused by extreme heat or heavy sweating. Skin irritants can cause the rash to flare up. These can include wool or silk clothing, grease, oils, some medicines, and harsh soaps and detergents. Emotional stress can also be a trigger.  Treatment is done to relieve the itching and inflammation of the skin.  Home care  Follow these tips to care for your condition:    Keep the areas of rash clean by bathing at least every other day. Use lukewarm water to bathe. Don t use hot water, which can dry out the skin.    Don t use soaps with strong detergents. Use mild soaps made for sensitive skin.    Apply a cream or ointment to damp skin right after bathing.    Avoid things that irritate your skin. Wear absorbent, soft fabrics next to the skin rather than rough or scratchy materials.    Use mild laundry soap free of scents and perfumes. Make sure to rinse all the soap out of your clothes.    Treat any skin infection as directed.    Use oral diphenhydramine to help reduce itching. This is an antihistamine you can buy at drug and grocery stores. It can make you sleepy, so use lower doses during the daytime. Or you can use loratadine. This is an antihistamine that will not make you sleepy. Do not use diphenhydramine if you have glaucoma or have trouble urinating due to an enlarged prostate.  Follow-up care  See  your healthcare provider, or as advised. If your symptoms don t get better or if they get worse in the next 7 days, make an appointment with your healthcare provider.  When to seek medical advice  Call your healthcare provider right away  if any of these occur:    Increasing area of redness or pain in the skin    Yellow crusts or wet drainage from the rash    Fever of 100.4 F (38 C) or higher, or as directed by your healthcare provider  Date Last Reviewed: 9/1/2016 2000-2018 [employer]. [address] [place], [place] [address]. All rights reserved. This information is not intended as a substitute for professional medical care. Always follow your healthcare professional's instructions.

## 2019-12-28 NOTE — PROGRESS NOTES
HPI  Patient is a 29-year-old female who presents to the clinic with a flare of her eczema on her upper eyelids.  She reports that it started to get bad about a week ago.  She has been using Cetaphil on it and has not initiated any treatment with steroids.  It is described as slightly itchy but also painful.  She has not had any changes in her vision nor significant swelling of the lids.    Review of Systems   Constitutional: Negative.    HENT: Negative.    Eyes: Negative.    Respiratory: Negative.    Cardiovascular: Negative.    Skin: Positive for rash.        eczema   Neurological: Negative.      Past Medical History:   Diagnosis Date     Coronary artery disease 2001    prolapsed mitral valve     Mitral valve prolapse unknown     Patient Active Problem List   Diagnosis     CARDIOVASCULAR SCREENING; LDL GOAL LESS THAN 160     Migraine     MVP (mitral valve prolapse)     Seasonal allergic rhinitis     Leg cramps in pregnancy     GERD (gastroesophageal reflux disease)     Aftercare following surgery of the musculoskeletal system      Past Surgical History:   Procedure Laterality Date     ARTHROSCOPY SHOULDER SUPERIOR LABRUM ANTERIOR TO POSTERIOR REPAIR Right 7/31/2019    Procedure: Right Shoulder Arthroscopic Labrum Anterior/Posterior Repair, Arthroscopic Paralabral Cyst Removal;  Surgeon: Matheus Resendiz MD;  Location: [facility] OR     BIOPSY BREAST      1 month ago     BREAST SURGERY  2011    lump     Allergies   Allergen Reactions     Vicodin [Hydrocodone-Acetaminophen] Nausea     Current Outpatient Medications   Medication     triamcinolone (ARISTOCORT HP) 0.5 % external cream     ciprofloxacin (CIPRO) 500 MG tablet     gabapentin (NEURONTIN) 300 MG capsule     hydrOXYzine (VISTARIL) 25 MG capsule     ibuprofen (ADVIL/MOTRIN) 800 MG tablet     oxyCODONE (ROXICODONE) 5 MG tablet     No current facility-administered medications for this visit.          Physical Exam  Vitals signs and nursing note reviewed.    Constitutional:       General: She is not in acute distress.     Comments: /69   Pulse 91   Temp 98.5  F (36.9  C) (Tympanic)   SpO2 99%      HENT:      Head: Normocephalic.     Eyes:      Extraocular Movements: Extraocular movements intact.      Conjunctiva/sclera: Conjunctivae normal.      Pupils: Pupils are equal, round, and reactive to light.   Cardiovascular:      Rate and Rhythm: Normal rate.   Pulmonary:      Effort: Pulmonary effort is normal.   Skin:     Findings: Rash present.   Neurological:      Mental Status: She is alert.       Assessment:  1. Other eczema        Plan:  Orders Placed This Encounter     triamcinolone (ARISTOCORT HP) 0.5 % external cream   May add claritin 10 mg daily for allergic component  Use triamcinolone for 1 week then change to 15 hydrocortisone  Caution not to get either product into the eye  Instructions regarding self-care of patient/child with eczema reviewed.   Written instructions provided in after visit summary and reviewed.  Patient instructed to see primary care provider for new or persistent symptoms.   Red flag symptoms reviewed and patient has been instructed to seek emergent care  Please contact pharmacy for medication questions.  Patient instructed to take medications as directed on package.      Yady López, DNP, APRN, CNP

## 2020-01-02 ENCOUNTER — THERAPY VISIT (OUTPATIENT)
Dept: PHYSICAL THERAPY | Facility: CLINIC | Age: 30
End: 2020-01-02
Payer: COMMERCIAL

## 2020-01-02 DIAGNOSIS — Z47.89 AFTERCARE FOLLOWING SURGERY OF THE MUSCULOSKELETAL SYSTEM: ICD-10-CM

## 2020-01-02 PROCEDURE — 97112 NEUROMUSCULAR REEDUCATION: CPT | Mod: GP | Performed by: PHYSICAL THERAPIST

## 2020-01-02 PROCEDURE — 97110 THERAPEUTIC EXERCISES: CPT | Mod: GP | Performed by: PHYSICAL THERAPIST

## 2020-01-02 PROCEDURE — 97140 MANUAL THERAPY 1/> REGIONS: CPT | Mod: GP | Performed by: PHYSICAL THERAPIST

## 2020-01-02 NOTE — PROGRESS NOTES
SUBJECTIVE  Subjective: Everything else feels sore from snowmobiling but the shoulder itself is feeling pretty good   Current Pain level: 0/10   Changes in function:  Yes (See Goal flowsheet attached for changes in current functional level)     Adverse reaction to treatment or activity:  None    OBJECTIVE  Objective: AROM R shoulder flex: 152, ABD: 149, ER: 82, IR: T9     ASSESSMENTpain level, ROM  and strengthSamantha continues to require intervention to meet STG and LTG's: PT  Patient is progressing as expected.  Response to therapy has shown an improvement in  pain level  Progress made towards STG/LTG?  Yes (See Goal flowsheet attached for updates on achievement of STG and LTG)    PLAN  Current treatment program is being advanced to more complex exercises.    PTA/ATC plan:  N/A    Please refer to the daily flowsheet for treatment today, total treatment time and time spent performing 1:1 timed codes.

## 2020-01-06 ENCOUNTER — THERAPY VISIT (OUTPATIENT)
Dept: PHYSICAL THERAPY | Facility: CLINIC | Age: 30
End: 2020-01-06
Payer: COMMERCIAL

## 2020-01-06 DIAGNOSIS — Z47.89 AFTERCARE FOLLOWING SURGERY OF THE MUSCULOSKELETAL SYSTEM: ICD-10-CM

## 2020-01-06 PROCEDURE — 97140 MANUAL THERAPY 1/> REGIONS: CPT | Mod: GP | Performed by: PHYSICAL THERAPY ASSISTANT

## 2020-01-06 PROCEDURE — 97110 THERAPEUTIC EXERCISES: CPT | Mod: GP | Performed by: PHYSICAL THERAPY ASSISTANT

## 2020-01-06 PROCEDURE — 97112 NEUROMUSCULAR REEDUCATION: CPT | Mod: GP | Performed by: PHYSICAL THERAPY ASSISTANT

## 2020-01-09 ENCOUNTER — THERAPY VISIT (OUTPATIENT)
Dept: PHYSICAL THERAPY | Facility: CLINIC | Age: 30
End: 2020-01-09
Payer: COMMERCIAL

## 2020-01-09 DIAGNOSIS — Z47.89 AFTERCARE FOLLOWING SURGERY OF THE MUSCULOSKELETAL SYSTEM: ICD-10-CM

## 2020-01-09 PROCEDURE — 97140 MANUAL THERAPY 1/> REGIONS: CPT | Mod: GP | Performed by: PHYSICAL THERAPIST

## 2020-01-09 PROCEDURE — 97112 NEUROMUSCULAR REEDUCATION: CPT | Mod: GP | Performed by: PHYSICAL THERAPIST

## 2020-01-09 PROCEDURE — 97110 THERAPEUTIC EXERCISES: CPT | Mod: GP | Performed by: PHYSICAL THERAPIST

## 2020-01-09 NOTE — PROGRESS NOTES
PROGRESS  REPORT    Progress reporting period is from 8/29/19 to 1/9/20.       SUBJECTIVE  Subjective: Hasn't been as good with stretches lately but still working on them.  Pain isn't bad unless she is stretching aggressively.  Overall feels weak but it's improving    Current Pain level: 0/10.     Initial Pain level: 2/10.   Changes in function:  Yes (See Goal flowsheet attached for changes in current functional level)  Adverse reaction to treatment or activity: None    OBJECTIVE  Objective: AROM R shoulder flex: 150, ABD: 152, ER: 68, IR: T8.  Able to lift 2# overhead with a lot of fatigue with reps.  RC is firing well, but again fatigues too easily     ASSESSMENT/PLAN  Updated problem list and treatment plan: Diagnosis 1:  Shoulder pain s/p labral debridement  Pain -  manual therapy, self management, education and home program  Decreased ROM/flexibility - manual therapy, therapeutic exercise, therapeutic activity and home program  Decreased joint mobility - manual therapy, therapeutic exercise and home program  Decreased strength - therapeutic exercise, therapeutic activities, home program and neuro re-ed  STG/LTGs have been met or progress has been made towards goals:  Yes (See Goal flow sheet completed today.)  Assessment of Progress: The patient's condition is improving.  The patient's condition has potential to improve.  Patient is meeting short term goals and is progressing towards long term goals.  Self Management Plans:  Patient has been instructed in a home treatment program.  Kyara continues to require the following intervention to meet STG and LTG's:  PT    Recommendations:  This patient would benefit from continued therapy.     Frequency:  2 X week, once daily  Duration:  for 1 weeks tapering to 1 X a week over 6 weeks        Please refer to the daily flowsheet for treatment today, total treatment time and time spent performing 1:1 timed codes.

## 2020-01-16 ENCOUNTER — THERAPY VISIT (OUTPATIENT)
Dept: PHYSICAL THERAPY | Facility: CLINIC | Age: 30
End: 2020-01-16
Payer: COMMERCIAL

## 2020-01-16 DIAGNOSIS — Z47.89 AFTERCARE FOLLOWING SURGERY OF THE MUSCULOSKELETAL SYSTEM: ICD-10-CM

## 2020-01-16 PROCEDURE — 97140 MANUAL THERAPY 1/> REGIONS: CPT | Mod: GP | Performed by: PHYSICAL THERAPIST

## 2020-01-16 PROCEDURE — 97112 NEUROMUSCULAR REEDUCATION: CPT | Mod: GP | Performed by: PHYSICAL THERAPIST

## 2020-01-16 PROCEDURE — 97110 THERAPEUTIC EXERCISES: CPT | Mod: GP | Performed by: PHYSICAL THERAPIST

## 2020-01-20 ENCOUNTER — OFFICE VISIT (OUTPATIENT)
Dept: ORTHOPEDICS | Facility: CLINIC | Age: 30
End: 2020-01-20
Payer: COMMERCIAL

## 2020-01-20 DIAGNOSIS — M25.511 CHRONIC RIGHT SHOULDER PAIN: Primary | ICD-10-CM

## 2020-01-20 DIAGNOSIS — G89.29 CHRONIC RIGHT SHOULDER PAIN: Primary | ICD-10-CM

## 2020-01-20 NOTE — PROGRESS NOTES
CHIEF COMPLAINT:  Right shoulder status post superior labral tear repair with paralabral cyst removal.  Date of surgery 07/31/2019.       HISTORY OF PRESENT ILLNESS:  Ms. Davis returns today for followup.  She has done pretty well.  She has been training for a marathon.  Overall, she is happy with the shoulder and it feels stable.  She did have an episode recently where her daughter leaned against it and it hurt when her daughter stopped.      PHYSICAL EXAMINATION:  On exam today, she has 180 degrees of forward elevation, 165 degrees of lateral elevation, 60 degrees of external rotation at side and internal rotation of the back to T10.  She has 5/5 forward elevator and external rotator strength and no pain with posteriorly directed force and good scapular stabilization.      ASSESSMENT:  Right shoulder status post above procedure, doing well.      PLAN:  I told Ms. Davis that she can do activities as tolerated and that she should continue to do her home program as this will likely improve her shoulder pain with running.  I told her that I will see her back at the anniversary of her surgery with radiographs or sooner should any additional problems arise.

## 2020-01-20 NOTE — NURSING NOTE
Reason For Visit:   Chief Complaint   Patient presents with     Surgical Followup     6 month post-op Superior labrum anterior posterior, Arthroscopic paralabral cyst removal 7/31/19      PCP: Nathalia Montgomery     ?  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  Work status?  Full time.  Date of injury: None  Date of surgery: 7/31/19  Type of surgery:   1.  Right shoulder arthroscopic decompression of paralabral cyst with extensive arthroscopic debridement of glenohumeral joint,   2.  Right shoulder arthroscopic superior labral tear repair.   Smoker: No     Right hand dominant      SANE score  Affected shoulder: Right  Right shoulder SANE: 95  Left shoulder SANE: 100      Pain Assessment  Patient Currently in Pain: Bartolo Merritt LPN

## 2020-01-28 ENCOUNTER — THERAPY VISIT (OUTPATIENT)
Dept: PHYSICAL THERAPY | Facility: CLINIC | Age: 30
End: 2020-01-28
Payer: COMMERCIAL

## 2020-01-28 DIAGNOSIS — Z47.89 AFTERCARE FOLLOWING SURGERY OF THE MUSCULOSKELETAL SYSTEM: ICD-10-CM

## 2020-01-28 PROCEDURE — 97140 MANUAL THERAPY 1/> REGIONS: CPT | Mod: GP | Performed by: PHYSICAL THERAPIST

## 2020-01-28 PROCEDURE — 97110 THERAPEUTIC EXERCISES: CPT | Mod: GP | Performed by: PHYSICAL THERAPIST

## 2020-01-28 PROCEDURE — 97112 NEUROMUSCULAR REEDUCATION: CPT | Mod: GP | Performed by: PHYSICAL THERAPIST

## 2020-01-28 NOTE — PROGRESS NOTES
SUBJECTIVE  Subjective: Saw surgeon last week and he has happy with her progress.  She does not need to see him again until 1 year follow up.  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  Response to therapy has shown an improvement in  pain level, ROM  and strength  Progress made towards STG/LTG?  Yes (See Goal flowsheet attached for updates on achievement of STG and LTG)    PLAN  Current treatment program is being advanced to more complex exercises.    PTA/ATC plan:  N/A    Please refer to the daily flowsheet for treatment today, total treatment time and time spent performing 1:1 timed codes.

## 2020-02-14 ENCOUNTER — THERAPY VISIT (OUTPATIENT)
Dept: PHYSICAL THERAPY | Facility: CLINIC | Age: 30
End: 2020-02-14
Payer: COMMERCIAL

## 2020-02-14 DIAGNOSIS — Z47.89 AFTERCARE FOLLOWING SURGERY OF THE MUSCULOSKELETAL SYSTEM: ICD-10-CM

## 2020-02-14 PROCEDURE — 97110 THERAPEUTIC EXERCISES: CPT | Mod: GP | Performed by: PHYSICAL THERAPIST

## 2020-02-14 PROCEDURE — 97112 NEUROMUSCULAR REEDUCATION: CPT | Mod: GP | Performed by: PHYSICAL THERAPIST

## 2020-02-14 NOTE — PROGRESS NOTES
DISCHARGE REPORT    Progress reporting period is from 8/29/19 to 2/14/20.       SUBJECTIVE  Subjective: Shoulder isn't really stopping her from doing anything anymore.  Feels like she is about 90% better    Current Pain level: 0/10.     Initial Pain level: 2/10.   Changes in function:  Yes (See Goal flowsheet attached for changes in current functional level)  Adverse reaction to treatment or activity: None    OBJECTIVE  Changes noted in objective findings:      R Shoulder AROM MMT   Flex 160 5/5    5-/5   ER 84 5/5   IR T7 5/5         ASSESSMENT/PLAN  Updated problem list and treatment plan: Diagnosis 1:  Shoulder pain s/p labral repair    STG/LTGs have been met or progress has been made towards goals:  Yes (See Goal flow sheet completed today.)  Assessment of Progress: The patient's condition is improving.  The patient has met all of their long term goals.  Self Management Plans:  Patient is independent in a home treatment program.  Kyara continues to require the following intervention to meet STG and LTG's:  PT intervention is no longer required to meet STG/LTG.    Recommendations:  This patient is ready to be discharged from therapy and continue their home treatment program.    Please refer to the daily flowsheet for treatment today, total treatment time and time spent performing 1:1 timed codes.

## 2020-03-01 ENCOUNTER — HEALTH MAINTENANCE LETTER (OUTPATIENT)
Age: 30
End: 2020-03-01

## 2020-06-24 ENCOUNTER — E-VISIT (OUTPATIENT)
Dept: FAMILY MEDICINE | Facility: CLINIC | Age: 30
End: 2020-06-24

## 2020-06-24 DIAGNOSIS — N30.00 ACUTE CYSTITIS WITHOUT HEMATURIA: Primary | ICD-10-CM

## 2020-06-24 PROCEDURE — 99207 ZZC NO CHARGE LOS: CPT | Performed by: FAMILY MEDICINE

## 2020-06-24 NOTE — TELEPHONE ENCOUNTER
This patient is going to need to come in for a urine test.   I am happy to see her at the 1:50 PM appointment time.   Kristen Kehr PA-C

## 2020-06-24 NOTE — TELEPHONE ENCOUNTER
Patient did not respond to my chart message nor did she show up for appointment at 1:50 with provider.  LEONEL Naranjo

## 2020-06-24 NOTE — TELEPHONE ENCOUNTER
Patient has read my chart message sent. Unsure if she is attending appointment. No response  LEONEL Naranjo

## 2020-06-26 DIAGNOSIS — R30.0 DYSURIA: ICD-10-CM

## 2020-06-26 LAB
ALBUMIN UR-MCNC: NEGATIVE MG/DL
APPEARANCE UR: ABNORMAL
BACTERIA #/AREA URNS HPF: ABNORMAL /HPF
BILIRUB UR QL STRIP: NEGATIVE
COLOR UR AUTO: YELLOW
GLUCOSE UR STRIP-MCNC: NEGATIVE MG/DL
HGB UR QL STRIP: ABNORMAL
KETONES UR STRIP-MCNC: NEGATIVE MG/DL
LEUKOCYTE ESTERASE UR QL STRIP: ABNORMAL
NITRATE UR QL: NEGATIVE
NON-SQ EPI CELLS #/AREA URNS LPF: ABNORMAL /LPF
PH UR STRIP: 6.5 PH (ref 5–7)
RBC #/AREA URNS AUTO: ABNORMAL /HPF
SOURCE: ABNORMAL
SP GR UR STRIP: 1.02 (ref 1–1.03)
UROBILINOGEN UR STRIP-ACNC: 0.2 EU/DL (ref 0.2–1)
WBC #/AREA URNS AUTO: ABNORMAL /HPF

## 2020-06-26 PROCEDURE — 87086 URINE CULTURE/COLONY COUNT: CPT | Performed by: FAMILY MEDICINE

## 2020-06-26 PROCEDURE — 81001 URINALYSIS AUTO W/SCOPE: CPT | Performed by: FAMILY MEDICINE

## 2020-06-26 RX ORDER — SULFAMETHOXAZOLE/TRIMETHOPRIM 800-160 MG
1 TABLET ORAL 2 TIMES DAILY
Qty: 14 TABLET | Refills: 0 | Status: SHIPPED | OUTPATIENT
Start: 2020-06-26 | End: 2020-07-03

## 2020-06-27 LAB
BACTERIA SPEC CULT: NORMAL
SPECIMEN SOURCE: NORMAL

## 2020-12-14 ENCOUNTER — HEALTH MAINTENANCE LETTER (OUTPATIENT)
Age: 30
End: 2020-12-14

## 2021-02-19 NOTE — ANESTHESIA POSTPROCEDURE EVALUATION
Anesthesia POST Procedure Evaluation    Patient: Kyara Davis   MRN:     [id number] Gender:   female   Age:    29 year old :      1990        Preoperative Diagnosis: Tear Of Left Glenoid Labrum   Procedure(s):  Right Shoulder Arthroscopic Labrum Anterior/Posterior Repair, Arthroscopic Paralabral Cyst Removal   Postop Comments: No value filed.       Anesthesia Type:  Not documented  MAC    Reportable Event: NO     PAIN: Uncomplicated   Sign Out status: Comfortable, Well controlled pain     PONV: No PONV   Sign Out status:  No Nausea or Vomiting     Neuro/Psych: Uneventful perioperative course   Sign Out Status: Preoperative baseline; Age appropriate mentation     Airway/Resp.: Uneventful perioperative course   Sign Out Status: Non labored breathing, age appropriate RR; Resp. Status within EXPECTED Parameters     CV: Uneventful perioperative course   Sign Out status: Appropriate BP and perfusion indices; Appropriate HR/Rhythm     Disposition:   Sign Out in:  PACU  Disposition:  Phase II; Home  Recovery Course: Uneventful  Follow-Up: Not required           Last Anesthesia Record Vitals:  CRNA VITALS  2019 1052 - 2019 1152      2019             NIBP:  99/39  (Abnormal)     Pulse:  56    NIBP Mean:  51    SpO2:  98 %    EKG:  Sinus bradycardia          Last PACU Vitals:  Vitals Value Taken Time   BP 86/42 2019 11:25 AM   Temp 36.9  C (98.5  F) 2019 11:25 AM   Pulse     Resp 16 2019 11:25 AM   SpO2 96 % 2019 11:25 AM   Temp src     NIBP 99/39 2019 11:19 AM   Pulse 56 2019 11:19 AM   SpO2 98 % 2019 11:19 AM   Resp     Temp     Ht Rate 59 2019 11:10 AM   Temp 2           Electronically Signed By: Rosario Cope MD, 2019, 8:37 AM   [] : results reviewed [de-identified] : NSR unchanged

## 2021-04-17 ENCOUNTER — HEALTH MAINTENANCE LETTER (OUTPATIENT)
Age: 31
End: 2021-04-17

## 2021-10-02 ENCOUNTER — HEALTH MAINTENANCE LETTER (OUTPATIENT)
Age: 31
End: 2021-10-02

## 2021-11-22 ENCOUNTER — ANCILLARY PROCEDURE (OUTPATIENT)
Dept: GENERAL RADIOLOGY | Facility: CLINIC | Age: 31
End: 2021-11-22
Attending: PEDIATRICS
Payer: COMMERCIAL

## 2021-11-22 ENCOUNTER — OFFICE VISIT (OUTPATIENT)
Dept: ORTHOPEDICS | Facility: CLINIC | Age: 31
End: 2021-11-22
Payer: COMMERCIAL

## 2021-11-22 VITALS
BODY MASS INDEX: 24.55 KG/M2 | DIASTOLIC BLOOD PRESSURE: 76 MMHG | WEIGHT: 130 LBS | HEIGHT: 61 IN | SYSTOLIC BLOOD PRESSURE: 113 MMHG

## 2021-11-22 DIAGNOSIS — M25.512 LEFT SHOULDER PAIN: ICD-10-CM

## 2021-11-22 DIAGNOSIS — S49.92XA INJURY OF LEFT SHOULDER, INITIAL ENCOUNTER: Primary | ICD-10-CM

## 2021-11-22 PROCEDURE — 73030 X-RAY EXAM OF SHOULDER: CPT | Mod: LT | Performed by: RADIOLOGY

## 2021-11-22 PROCEDURE — 99214 OFFICE O/P EST MOD 30 MIN: CPT | Performed by: PEDIATRICS

## 2021-11-22 ASSESSMENT — MIFFLIN-ST. JEOR: SCORE: 1237.06

## 2021-11-22 NOTE — PATIENT INSTRUCTIONS
We discussed these other possible diagnosis: concern for rotator cuff, labral injury    Plan:  - Today's Plan of Care:  MRI of the Left Shoulder - Call [phone] to schedule MRI  Recommend 3T imaging at [facility]    Continue with relative rest and activity modification, Ice, Compression, and Elevation.  Can apply ice 10-15 minutes 3-4 times per day as needed. OTC medications as needed.    -We also discussed other future treatment options:  Referral to Physical Therapy  Referral to Orthopedic Surgery  Consideration of injections    Follow Up: In clinic with Dr. Mayers after MRI (wait at least 1-2 days)    If you have any further questions for your physician or physician s care team you can call [phone] and use option 3 to leave a voice message. Calls received during business hours will be returned same day.

## 2021-11-22 NOTE — PROGRESS NOTES
ASSESSMENT & PLAN    Kyara was seen today for pain.    Diagnoses and all orders for this visit:    Injury of left shoulder, initial encounter  -     XR Shoulder Left G/E 3 Views; Future  -     MR Shoulder Left w/o Contrast; Future      This issue is acute and Unchanged.    We discussed these other possible diagnosis: concern for rotator cuff, labral injury  Will obtain MRI to evaluate, discussed MR arthrogram v. 3T imaging.    Plan:  - Today's Plan of Care:  MRI of the Left Shoulder - Call [phone] to schedule MRI  Recommend 3T imaging at [facility]    Continue with relative rest and activity modification, Ice, Compression, and Elevation.  Can apply ice 10-15 minutes 3-4 times per day as needed. OTC medications as needed.    -We also discussed other future treatment options:  Referral to Physical Therapy  Referral to Orthopedic Surgery  Consideration of injections    Follow Up: In clinic with Dr. Mayers after MRI (wait at least 1-2 days)    Concerning signs and symptoms were reviewed.  The patient expressed understanding of this management plan and all questions were answered at this time.    Narda Mayers MD [facility]  Sports Medicine Physician  [facility]      -----  Chief Complaint   Patient presents with     Left Shoulder - Pain       SUBJECTIVE  Kyara Davis is a/an 31 year old female who is seen as a self referral for evaluation of left shoulder.  She was jumping out of a truck and used that arm to brace. She felt like it was over-extended.      The patient is seen by themselves.    Onset: 1.5 month(s) ago. Patient describes injury as extension of arm and pushing/bracaing on it  Location of Pain: left shoulder; AC joint  Worsened by: flexion, abduction, IR, sleeping on it  Better with: ibuprofen   Treatments tried: rest/activity avoidance, ice and ibuprofen  Associated symptoms: numbness, tingling, warmth, weakness of shoulder, locking or catching and feeling of  "instability    Orthopedic/Surgical history: YES - Date: right shoulder surgery, labral repair, suprascapular nerve release, cyst removal 2019  Social History/Occupation: she is active for her job    No family history pertinent to patient's problem today.    REVIEW OF SYSTEMS:  Review of Systems  Skin: no bruising, no swelling  Musculoskeletal: as above  Neurologic: no numbness, paresthesias  Remainder of review of systems is negative including constitutional, CV, pulmonary, GI, except as noted in HPI or medical history.    OBJECTIVE:  /76   Ht 1.549 m (5' 1\")   Wt 59 kg (130 lb)   BMI 24.56 kg/m     General: healthy, alert and in no distress  HEENT: no scleral icterus or conjunctival erythema  Skin: no suspicious lesions or rash. No jaundice.  CV: distal perfusion intact  Resp: normal respiratory effort without conversational dyspnea   Psych: normal mood and affect  Gait: normal steady gait with appropriate coordination and balance  Neuro: Normal light sensory exam of upper extremity    Bilateral Shoulder exam  Inspection and Posture:       rounded shoulders and upper back    Skin:        no visible deformities    Tender:        subacromial space left    Non Tender:       remainder of shoulder left    ROM:        forward flexion 90  left       abduction 90 left       internal rotation gluteal left       external rotation 45 left       asymmetric scapular motion    Painful motions:       flexion left       elevation left    Strength:        abduction 4/5 left       flexion 5/5 left       internal rotation 5/5 left       external rotation 5/5 left    Sensation:        normal sensation over shoulder and upper extremity     Impingement testing:       neg (-) Neer left       positive (+) Fang left       positive (+) O'niyah left      RADIOLOGY:  I independently ordered, visualized and reviewed these images with the patient  4 XR views of left shoulder reviewed: no acute bony abnormality, no significant " degenerative change  - will follow official read      Review of the result(s) of each unique test - XR

## 2021-11-22 NOTE — LETTER
11/22/2021         RE: Kyara Davis  [address] [place] [address]        Dear Colleague,    Thank you for referring your patient, Kyara Davis, to the [facility] [place]. Please see a copy of my visit note below.    ASSESSMENT & PLAN    Kyara was seen today for pain.    Diagnoses and all orders for this visit:    Injury of left shoulder, initial encounter  -     XR Shoulder Left G/E 3 Views; Future  -     MR Shoulder Left w/o Contrast; Future      This issue is acute and Unchanged.    We discussed these other possible diagnosis: concern for rotator cuff, labral injury  Will obtain MRI to evaluate, discussed MR arthrogram v. 3T imaging.    Plan:  - Today's Plan of Care:  MRI of the Left Shoulder - Call [phone] to schedule MRI  Recommend 3T imaging at [facility]    Continue with relative rest and activity modification, Ice, Compression, and Elevation.  Can apply ice 10-15 minutes 3-4 times per day as needed. OTC medications as needed.    -We also discussed other future treatment options:  Referral to Physical Therapy  Referral to Orthopedic Surgery  Consideration of injections    Follow Up: In clinic with Dr. Mayers after MRI (wait at least 1-2 days)    Concerning signs and symptoms were reviewed.  The patient expressed understanding of this management plan and all questions were answered at this time.    Narda Mayers MD [facility]  Sports Medicine Physician  [facility]      -----  Chief Complaint   Patient presents with     Left Shoulder - Pain       SUBJECTIVE  Kyara Davis is a/an 31 year old female who is seen as a self referral for evaluation of left shoulder.  She was jumping out of a truck and used that arm to brace. She felt like it was over-extended.      The patient is seen by themselves.    Onset: 1.5 month(s) ago. Patient describes injury as extension of arm and pushing/bracaing on it  Location of Pain: left shoulder; AC  "joint  Worsened by: flexion, abduction, IR, sleeping on it  Better with: ibuprofen   Treatments tried: rest/activity avoidance, ice and ibuprofen  Associated symptoms: numbness, tingling, warmth, weakness of shoulder, locking or catching and feeling of instability    Orthopedic/Surgical history: YES - Date: right shoulder surgery, labral repair, suprascapular nerve release, cyst removal 2019  Social History/Occupation: she is active for her job    No family history pertinent to patient's problem today.    REVIEW OF SYSTEMS:  Review of Systems  Skin: no bruising, no swelling  Musculoskeletal: as above  Neurologic: no numbness, paresthesias  Remainder of review of systems is negative including constitutional, CV, pulmonary, GI, except as noted in HPI or medical history.    OBJECTIVE:  /76   Ht 1.549 m (5' 1\")   Wt 59 kg (130 lb)   BMI 24.56 kg/m     General: healthy, alert and in no distress  HEENT: no scleral icterus or conjunctival erythema  Skin: no suspicious lesions or rash. No jaundice.  CV: distal perfusion intact  Resp: normal respiratory effort without conversational dyspnea   Psych: normal mood and affect  Gait: normal steady gait with appropriate coordination and balance  Neuro: Normal light sensory exam of upper extremity    Bilateral Shoulder exam  Inspection and Posture:       rounded shoulders and upper back    Skin:        no visible deformities    Tender:        subacromial space left    Non Tender:       remainder of shoulder left    ROM:        forward flexion 90  left       abduction 90 left       internal rotation gluteal left       external rotation 45 left       asymmetric scapular motion    Painful motions:       flexion left       elevation left    Strength:        abduction 4/5 left       flexion 5/5 left       internal rotation 5/5 left       external rotation 5/5 left    Sensation:        normal sensation over shoulder and upper extremity     Impingement testing:       neg (-) Neer " left       positive (+) Fang left       positive (+) O'niyah left      RADIOLOGY:  I independently ordered, visualized and reviewed these images with the patient  4 XR views of left shoulder reviewed: no acute bony abnormality, no significant degenerative change  - will follow official read      Review of the result(s) of each unique test - XR             Again, thank you for allowing me to participate in the care of your patient.        Sincerely,        Narda Mayers MD

## 2022-05-14 ENCOUNTER — HEALTH MAINTENANCE LETTER (OUTPATIENT)
Age: 32
End: 2022-05-14

## 2022-09-03 ENCOUNTER — HEALTH MAINTENANCE LETTER (OUTPATIENT)
Age: 32
End: 2022-09-03

## 2022-10-13 ENCOUNTER — ANCILLARY PROCEDURE (OUTPATIENT)
Dept: GENERAL RADIOLOGY | Facility: CLINIC | Age: 32
End: 2022-10-13
Attending: FAMILY MEDICINE
Payer: COMMERCIAL

## 2022-10-13 ENCOUNTER — OFFICE VISIT (OUTPATIENT)
Dept: ORTHOPEDICS | Facility: CLINIC | Age: 32
End: 2022-10-13
Payer: COMMERCIAL

## 2022-10-13 VITALS
DIASTOLIC BLOOD PRESSURE: 84 MMHG | SYSTOLIC BLOOD PRESSURE: 119 MMHG | WEIGHT: 134 LBS | HEIGHT: 61 IN | BODY MASS INDEX: 25.3 KG/M2

## 2022-10-13 DIAGNOSIS — M25.561 ACUTE PAIN OF RIGHT KNEE: ICD-10-CM

## 2022-10-13 DIAGNOSIS — M25.461 KNEE EFFUSION, RIGHT: ICD-10-CM

## 2022-10-13 DIAGNOSIS — M23.91 LOCKING KNEE, RIGHT: ICD-10-CM

## 2022-10-13 DIAGNOSIS — M25.561 ACUTE PAIN OF RIGHT KNEE: Primary | ICD-10-CM

## 2022-10-13 DIAGNOSIS — M25.361 KNEE INSTABILITY, RIGHT: ICD-10-CM

## 2022-10-13 PROCEDURE — 73562 X-RAY EXAM OF KNEE 3: CPT | Mod: TC | Performed by: RADIOLOGY

## 2022-10-13 PROCEDURE — 99214 OFFICE O/P EST MOD 30 MIN: CPT | Performed by: FAMILY MEDICINE

## 2022-10-13 NOTE — LETTER
10/13/2022         RE: Kyara Davis  [address] [place] [address]        Dear Colleague,    Thank you for referring your patient, Kyara Davis, to the [facility] [place]. Please see a copy of my visit note below.    Kyara Davis  :  1990  DOS: 10/13/2022  MRN: [id number]    Sports Medicine Clinic Visit    PCP: Nathalia Montgomery    Kyara Davis is a 32 year old female who is seen as a self referral presenting with acute right knee pain.    Injury: Gradual onset of pain over the past 2+ weeks with lateral movement.  Pain located over right deep medial knee, nonradiating.  Additional Features:  Positive: swelling, weakness and joint stiffness.  Symptoms are better with Ibuprofen and Rest.  Symptoms are worse with: lateral movement, ascending stairs, bending knee.  Other evaluation and/or treatments so far consists of: Ice, Ibuprofen and Rest.  Recent imaging completed: No recent imaging completed.  Prior History of related problems: none - history of multiple joint injuries (hip, shoulder labral tears) over past 5+ years.    Social History: currently employed as  cadet,     Review of Systems  Musculoskeletal: as above  Remainder of review of systems is negative including constitutional, CV, pulmonary, GI, Skin and Neurologic except as noted in HPI or medical history.    Past Medical History:   Diagnosis Date     Coronary artery disease     prolapsed mitral valve     Mitral valve prolapse unknown     Past Surgical History:   Procedure Laterality Date     ARTHROSCOPY SHOULDER SUPERIOR LABRUM ANTERIOR TO POSTERIOR REPAIR Right 2019    Procedure: Right Shoulder Arthroscopic Labrum Anterior/Posterior Repair, Arthroscopic Paralabral Cyst Removal;  Surgeon: Matheus Resendiz MD;  Location: [facility] OR     BIOPSY BREAST      1 month ago     BREAST SURGERY  2011    lump     Family History   Problem  "Relation Age of Onset     Other Cancer Maternal Grandfather         full body     Other Cancer Paternal Grandmother         ovarian     Diabetes Paternal Grandfather         type 2     Diabetes Niece         type 1       Objective  /84   Ht 1.549 m (5' 1\")   Wt 60.8 kg (134 lb)   BMI 25.32 kg/m        General: healthy, alert and in no distress      HEENT: no scleral icterus or conjunctival erythema     Skin: no suspicious lesions or rash. No jaundice.     CV: regular rhythm by palpation, 2+ distal pulses, no pedal edema      Resp: normal respiratory effort without conversational dyspnea     Psych: normal mood and affect      Gait: mildly antalgic, appropriate coordination and balance     Neuro: normal light touch sensory exam of the extremities. Motor strength as noted below     Right Knee exam    ROM:        Range of motion limited by pain/pressure with terminal flexion and extension    Inspection:       no visible ecchymosis        effusion noted small    Skin:       no visible deformities       well perfused       capillary refill brisk    Patellar Motion:        Normal patellar tracking noted through range of motion       Crepitus noted in the patellofemoral joint    Tender:        medial joint line       along MCL       Medial tibial plateau    Non Tender:         remainder of knee area        medial patellar border        lateral patellar border        lateral joint line        distal IT Band        infrapatellar tendon        tibial tubercle       pes anserine bursa    Special Tests:        positive (+) Taylor       neg (-) Lachman       neg (-) anterior drawer       neg (-) posterior drawer       neg (-) varus at 0 deg and 30 deg       neg (-) valgus at 0 deg and 30 deg       positive pain with forced extension    Evaluation of ipsilateral kinetic chain       normal strength with hip extension and abduction       Mildly decreased right quad activation      Radiology  Recent Results (from the past " 744 hour(s))   XR Knee Standing AP [place] Bilat Lat Right    Narrative    KNEE STANDING AP BILATERAL, [place] BILATERAL, LATERAL RIGHT   10/13/2022 2:02 PM     INDICATION: Acute right knee pain.    COMPARISON: None.      Impression    IMPRESSION: Normal joint spacing and alignment.  No fracture.  No  joint effusion.    IDRIS BAKER MD         SYSTEM ID:  [id number]       Assessment:  1. Acute pain of right knee    2. Locking knee, right    3. Knee instability, right    4. Knee effusion, right        Plan:  Discussed the assessment with the patient.  Follow up: plan to reach out with MRI results if obtained  Acute right knee pain, no significant injury but increased demand lately, in awkward positions for  training  Letter for work/training provided  Knee bracing options reviewed  Oral Tylenol and topical Voltaren gel reviewed as safe OTC options, reviewed safe dosing strategies  MRI ordered for better diagnostic clarity given mechanical sx of locking, catching and intermittent instability sensations  XR images independently visualized and reviewed with patient today in clinic  RICE and bracing options reviewed  We discussed modified progressive pain-free activity as tolerated  Home handouts provided and supportive care reviewed  All questions were answered today  Contact us with additional questions or concerns  Signs and sx of concern reviewed      Christophe Rivera DO, CAQ  Sports Medicine Physician  [facility]      Time spent in chart review, one-on-one evaluation, discussion with patient regarding: nature of problem, clinical course, prior treatments, therapeutic options, shared-decision making, potential procedures and referrals, and charting related to the visit: 32 minutes.  If applicable, time does not include time spent performing any procedure.            Disclaimer: This note consists of symbols derived from keyboarding, dictation and/or voice recognition  software. As a result, there may be errors in the script that have gone undetected. Please consider this when interpreting information found in this chart.      Again, thank you for allowing me to participate in the care of your patient.        Sincerely,        Christophe Rivera, DO

## 2022-10-13 NOTE — LETTER
October 13, 2022      Devang was seen in my office today.  She should limit painful or unsafe activity as needed over the next 6 weeks relative to her right knee.  Squatting, running, lunging, kneeling, climbing and defensive tactics activity are the most likely activities to cause pain and should be modified as needed.  She can otherwise continue to participate as tolerated.  Any absence from physical activity during her training should be considered medically excused if needed.  Updated recommendations will be provided as needed.      Christophe Villasenor DO, CAQ  Sports Medicine Physician  [facility]

## 2022-10-13 NOTE — PROGRESS NOTES
"Kyara Davis  :  1990  DOS: 10/13/2022  MRN: [id number]    Sports Medicine Clinic Visit    PCP: Nathalia Montgomery    Kyara Davis is a 32 year old female who is seen as a self referral presenting with acute right knee pain.    Injury: Gradual onset of pain over the past 2+ weeks with lateral movement.  Pain located over right deep medial knee, nonradiating.  Additional Features:  Positive: swelling, weakness and joint stiffness.  Symptoms are better with Ibuprofen and Rest.  Symptoms are worse with: lateral movement, ascending stairs, bending knee.  Other evaluation and/or treatments so far consists of: Ice, Ibuprofen and Rest.  Recent imaging completed: No recent imaging completed.  Prior History of related problems: none - history of multiple joint injuries (hip, shoulder labral tears) over past 5+ years.    Social History: currently employed as  cadet,     Review of Systems  Musculoskeletal: as above  Remainder of review of systems is negative including constitutional, CV, pulmonary, GI, Skin and Neurologic except as noted in HPI or medical history.    Past Medical History:   Diagnosis Date     Coronary artery disease     prolapsed mitral valve     Mitral valve prolapse unknown     Past Surgical History:   Procedure Laterality Date     ARTHROSCOPY SHOULDER SUPERIOR LABRUM ANTERIOR TO POSTERIOR REPAIR Right 2019    Procedure: Right Shoulder Arthroscopic Labrum Anterior/Posterior Repair, Arthroscopic Paralabral Cyst Removal;  Surgeon: Matheus Resendiz MD;  Location: [facility]     BIOPSY BREAST      1 month ago     BREAST SURGERY  2011    lump     Family History   Problem Relation Age of Onset     Other Cancer Maternal Grandfather         full body     Other Cancer Paternal Grandmother         ovarian     Diabetes Paternal Grandfather         type 2     Diabetes Niece         type 1       Objective  /84   Ht 1.549 m (5' 1\")   Wt " 60.8 kg (134 lb)   BMI 25.32 kg/m        General: healthy, alert and in no distress      HEENT: no scleral icterus or conjunctival erythema     Skin: no suspicious lesions or rash. No jaundice.     CV: regular rhythm by palpation, 2+ distal pulses, no pedal edema      Resp: normal respiratory effort without conversational dyspnea     Psych: normal mood and affect      Gait: mildly antalgic, appropriate coordination and balance     Neuro: normal light touch sensory exam of the extremities. Motor strength as noted below     Right Knee exam    ROM:        Range of motion limited by pain/pressure with terminal flexion and extension    Inspection:       no visible ecchymosis        effusion noted small    Skin:       no visible deformities       well perfused       capillary refill brisk    Patellar Motion:        Normal patellar tracking noted through range of motion       Crepitus noted in the patellofemoral joint    Tender:        medial joint line       along MCL       Medial tibial plateau    Non Tender:         remainder of knee area        medial patellar border        lateral patellar border        lateral joint line        distal IT Band        infrapatellar tendon        tibial tubercle       pes anserine bursa    Special Tests:        positive (+) Taylor       neg (-) Lachman       neg (-) anterior drawer       neg (-) posterior drawer       neg (-) varus at 0 deg and 30 deg       neg (-) valgus at 0 deg and 30 deg       positive pain with forced extension    Evaluation of ipsilateral kinetic chain       normal strength with hip extension and abduction       Mildly decreased right quad activation      Radiology  Recent Results (from the past 744 hour(s))   XR Knee Standing AP [place] Right    Narrative    KNEE STANDING AP BILATERAL, [place] BILATERAL, LATERAL RIGHT   10/13/2022 2:02 PM     INDICATION: Acute right knee pain.    COMPARISON: None.      Impression    IMPRESSION: Normal joint spacing  and alignment.  No fracture.  No  joint effusion.    IDRIS BAKER MD         SYSTEM ID:  [id number]       Assessment:  1. Acute pain of right knee    2. Locking knee, right    3. Knee instability, right    4. Knee effusion, right        Plan:  Discussed the assessment with the patient.  Follow up: plan to reach out with MRI results if obtained  Acute right knee pain, no significant injury but increased demand lately, in awkward positions for  training  Letter for work/training provided  Knee bracing options reviewed  Oral Tylenol and topical Voltaren gel reviewed as safe OTC options, reviewed safe dosing strategies  MRI ordered for better diagnostic clarity given mechanical sx of locking, catching and intermittent instability sensations  XR images independently visualized and reviewed with patient today in clinic  RICE and bracing options reviewed  We discussed modified progressive pain-free activity as tolerated  Home handouts provided and supportive care reviewed  All questions were answered today  Contact us with additional questions or concerns  Signs and sx of concern reviewed      Christophe Rivera DO, CAQ  Sports Medicine Physician  [facility]      Time spent in chart review, one-on-one evaluation, discussion with patient regarding: nature of problem, clinical course, prior treatments, therapeutic options, shared-decision making, potential procedures and referrals, and charting related to the visit: 32 minutes.  If applicable, time does not include time spent performing any procedure.            Disclaimer: This note consists of symbols derived from keyboarding, dictation and/or voice recognition software. As a result, there may be errors in the script that have gone undetected. Please consider this when interpreting information found in this chart.

## 2022-10-14 ENCOUNTER — MYC MEDICAL ADVICE (OUTPATIENT)
Dept: ORTHOPEDICS | Facility: CLINIC | Age: 32
End: 2022-10-14

## 2022-10-14 DIAGNOSIS — M25.461 KNEE EFFUSION, RIGHT: Primary | ICD-10-CM

## 2022-10-14 DIAGNOSIS — M25.361 KNEE INSTABILITY, RIGHT: ICD-10-CM

## 2022-11-01 ENCOUNTER — ANCILLARY PROCEDURE (OUTPATIENT)
Dept: MRI IMAGING | Facility: CLINIC | Age: 32
End: 2022-11-01
Attending: FAMILY MEDICINE
Payer: COMMERCIAL

## 2022-11-01 DIAGNOSIS — M25.461 KNEE EFFUSION, RIGHT: ICD-10-CM

## 2022-11-01 DIAGNOSIS — M25.361 KNEE INSTABILITY, RIGHT: ICD-10-CM

## 2022-11-01 PROCEDURE — 73721 MRI JNT OF LWR EXTRE W/O DYE: CPT | Mod: RT | Performed by: RADIOLOGY

## 2022-11-03 NOTE — TELEPHONE ENCOUNTER
Reviewed reassuring MRI results, offered PT in the future if needed for labile pain.  All questions answered.      Christophe Rivera DO, GUANAKITO  Sports Medicine Physician  [facility]

## 2023-04-23 ENCOUNTER — HEALTH MAINTENANCE LETTER (OUTPATIENT)
Age: 33
End: 2023-04-23

## 2023-04-27 ENCOUNTER — E-VISIT (OUTPATIENT)
Dept: URGENT CARE | Facility: CLINIC | Age: 33
End: 2023-04-27
Payer: COMMERCIAL

## 2023-04-27 DIAGNOSIS — R06.02 SHORTNESS OF BREATH: Primary | ICD-10-CM

## 2023-04-27 PROCEDURE — 99207 PR NON-BILLABLE SERV PER CHARTING: CPT | Performed by: PHYSICIAN ASSISTANT

## 2023-04-27 NOTE — PATIENT INSTRUCTIONS
Dear Kyara Davis,    We are sorry you are not feeling well. Based on the responses you provided, it is recommended that you be seen in-person in urgent care so we can better evaluate your symptoms. Please click here to find the nearest urgent care location to you.   You will not be charged for this Visit. Thank you for trusting us with your care.    Charlene Alanis PA-C, PARONNI

## 2023-07-30 ENCOUNTER — ANCILLARY PROCEDURE (OUTPATIENT)
Dept: GENERAL RADIOLOGY | Facility: CLINIC | Age: 33
End: 2023-07-30
Attending: FAMILY MEDICINE
Payer: COMMERCIAL

## 2023-07-30 ENCOUNTER — OFFICE VISIT (OUTPATIENT)
Dept: URGENT CARE | Facility: URGENT CARE | Age: 33
End: 2023-07-30
Payer: COMMERCIAL

## 2023-07-30 VITALS
DIASTOLIC BLOOD PRESSURE: 70 MMHG | HEART RATE: 75 BPM | WEIGHT: 125 LBS | TEMPERATURE: 98 F | SYSTOLIC BLOOD PRESSURE: 106 MMHG | BODY MASS INDEX: 23.62 KG/M2 | OXYGEN SATURATION: 97 % | RESPIRATION RATE: 15 BRPM

## 2023-07-30 DIAGNOSIS — S99.921A INJURY OF RIGHT FOOT, INITIAL ENCOUNTER: ICD-10-CM

## 2023-07-30 DIAGNOSIS — S69.91XA INJURY OF RIGHT THUMB, INITIAL ENCOUNTER: ICD-10-CM

## 2023-07-30 DIAGNOSIS — S99.921A INJURY OF RIGHT FOOT, INITIAL ENCOUNTER: Primary | ICD-10-CM

## 2023-07-30 DIAGNOSIS — S92.424A CLOSED NONDISPLACED FRACTURE OF DISTAL PHALANX OF RIGHT GREAT TOE, INITIAL ENCOUNTER: ICD-10-CM

## 2023-07-30 PROCEDURE — 73140 X-RAY EXAM OF FINGER(S): CPT | Mod: TC | Performed by: RADIOLOGY

## 2023-07-30 PROCEDURE — 73630 X-RAY EXAM OF FOOT: CPT | Mod: TC | Performed by: RADIOLOGY

## 2023-07-30 PROCEDURE — 99213 OFFICE O/P EST LOW 20 MIN: CPT | Performed by: FAMILY MEDICINE

## 2023-07-30 RX ORDER — IBUPROFEN 200 MG
400 TABLET ORAL EVERY 4 HOURS PRN
COMMUNITY
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
## 2023-08-03 ENCOUNTER — OFFICE VISIT (OUTPATIENT)
Dept: PODIATRY | Facility: CLINIC | Age: 33
End: 2023-08-03
Attending: FAMILY MEDICINE
Payer: COMMERCIAL

## 2023-08-03 ENCOUNTER — ANCILLARY PROCEDURE (OUTPATIENT)
Dept: GENERAL RADIOLOGY | Facility: CLINIC | Age: 33
End: 2023-08-03
Attending: PODIATRIST
Payer: COMMERCIAL

## 2023-08-03 VITALS
BODY MASS INDEX: 23.6 KG/M2 | DIASTOLIC BLOOD PRESSURE: 71 MMHG | HEART RATE: 68 BPM | WEIGHT: 125 LBS | HEIGHT: 61 IN | SYSTOLIC BLOOD PRESSURE: 104 MMHG

## 2023-08-03 DIAGNOSIS — M89.9 LESION OF BONE OF ANKLE: Primary | ICD-10-CM

## 2023-08-03 DIAGNOSIS — S92.424A CLOSED NONDISPLACED FRACTURE OF DISTAL PHALANX OF RIGHT GREAT TOE, INITIAL ENCOUNTER: ICD-10-CM

## 2023-08-03 PROCEDURE — 99203 OFFICE O/P NEW LOW 30 MIN: CPT | Performed by: PODIATRIST

## 2023-08-03 PROCEDURE — 73610 X-RAY EXAM OF ANKLE: CPT | Mod: TC | Performed by: RADIOLOGY

## 2023-08-03 ASSESSMENT — PAIN SCALES - GENERAL: PAINLEVEL: EXTREME PAIN (9)

## 2023-08-03 NOTE — NURSING NOTE
"Chief Complaint   Patient presents with    Fracture     Right foot- great toe       Initial /71   Pulse 68   Ht 1.549 m (5' 1\")   Wt 56.7 kg (125 lb)   BMI 23.62 kg/m   Estimated body mass index is 23.62 kg/m  as calculated from the following:    Height as of this encounter: 1.549 m (5' 1\").    Weight as of this encounter: 56.7 kg (125 lb).  Medications and allergies reviewed.      Brooklyn FLORIAN MA    "

## 2023-08-03 NOTE — PATIENT INSTRUCTIONS
TOE & METATARSAL FRACTURES  The structure of the foot is complex, consisting of bones, muscles, tendons, and other soft tissues. Of the 26 bones in the foot, 19 are toe bones (phalanges) and metatarsal bones (the long bones in the midfoot). Fractures of the toe and metatarsal bones are common and require evaluation by a specialist. A foot and ankle surgeon should be seen for proper diagnosis and treatment, even if initial treatment has been received in an emergency room.  A fracture is a break in the bone. Fractures can be divided into two categories: traumatic fractures and stress fractures.  TRAUMATIC FRACTURES (also called acute fractures) are caused by a direct blow or impact, such as seriously stubbing your toe. Traumatic fractures can be displaced or non-displaced. If the fracture is displaced, the bone is broken in such a way that it has changed in position (dislocated).  Signs and symptoms of a traumatic fracture include:  You may hear a sound at the time of the break.    Pinpoint pain  (pain at the place of impact) at the time the fracture occurs and perhaps for a few hours later, but often the pain goes away after several hours.   Crooked or abnormal appearance of the toe.   Bruising and swelling the next day.   It is not true that  if you can walk on it, it s not broken.  Evaluation by a foot and ankle surgeon is always recommended.   STRESS FRACTURES are tiny, hairline breaks that are usually caused by repetitive stress. Stress fractures often afflict athletes who, for example, too rapidly increase their running mileage. They can also be caused by an abnormal foot structure, deformities, or osteoporosis. Improper footwear may also lead to stress fractures. Stress fractures should not be ignored. They require proper medical attention to heal correctly.  Symptoms of stress fractures include:  Pain with or after normal activity   Pain that goes away when resting and then returns when standing or during  activity    Pinpoint pain  (pain at the site of the fracture) when touched   Swelling, but no bruising   IMPROPER TREATMENT  Some people say that  the doctor can t do anything for a broken bone in the foot.  This is usually not true. In fact, if a fractured toe or metatarsal bone is not treated correctly, serious complications may develop. For example:  A deformity in the bony architecture which may limit the ability to move the foot or cause difficulty in fitting shoes   Arthritis, which may be caused by a fracture in a joint (the juncture where two bones meet), or may be a result of angular deformities that develop when a displaced fracture is severe or hasn t been properly corrected   Chronic pain and deformity   Non-union, or failure to heal, can lead to subsequent surgery or chronic pain.   PROPER TREATMENT FOR TOES  Fractures of the toe bones are almost always traumatic fractures. Treatment for traumatic fractures depends on the break itself and may include these options:  Rest. Sometimes rest is all that is needed to treat a traumatic fracture of the toe.   Splinting. The toe may be fitted with a splint to keep it in a fixed position.   Rigid or stiff-soled shoe. Wearing a stiff-soled shoe protects the toe and helps keep it properly positioned.    Ravindra taping  the fractured toe to another toe is sometimes appropriate, but in other cases it may be harmful.   Surgery. If the break is badly displaced or if the joint is affected, surgery may be necessary. Surgery often involves the use of fixation devices, such as pins.   PROPER TREATMENT OF METATARSALS  Breaks in the metatarsal bones may be either stress or traumatic fractures. Certain kinds of fractures of the metatarsal bones present unique challenges.  For example, sometimes a fracture of the first metatarsal bone (behind the big toe) can lead to arthritis. Since the big toe is used so frequently and bears more weight than other toes, arthritis in that area  can make it painful to walk, bend, or even stand.  Another type of break, called a Awan fracture, occurs at the base of the fifth metatarsal bone (behind the little toe). It is often misdiagnosed as an ankle sprain, and misdiagnosis can have serious consequences since sprains and fractures require different treatments. Your foot and ankle surgeon is an expert in correctly identifying these conditions as well as other problems of the foot.  Treatment of metatarsal fractures depends on the type and extent of the fracture, and may include:  Rest. Sometimes rest is the only treatment needed to promote healing of a stress or traumatic fracture of a metatarsal bone.   Avoid the offending activity. Because stress fractures result from repetitive stress, it is important to avoid the activity that led to the fracture. Crutches or a wheelchair are sometimes required to offload weight from the foot to give it time to heal.   Immobilization, casting, or rigid shoe. A stiff-soled shoe or other form of immobilization may be used to protect the fractured bone while it is healing.   Surgery. Some traumatic fractures of the metatarsal bones require surgery, especially if the break is badly displaced.   Follow-up care. Your foot and ankle surgeon will provide instructions for care following surgical or non-surgical treatment. Physical therapy, exercises and rehabilitation may be included in a schedule for return to normal activities.
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
## 2023-08-03 NOTE — LETTER
August 3, 2023      Kyara Davis  [address] [place] [address]        To Whom It May Concern:    Kyara Davis was seen in our clinic. She may return to work with the following: limited to light duty - must wear CAM boot on or about 8/3/23 for one month.  The patient will return in one month for reevaluation and updated work status.      Sincerely,        Abisai Harkins DPM
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
## 2023-08-21 ENCOUNTER — OFFICE VISIT (OUTPATIENT)
Dept: ORTHOPEDICS | Facility: CLINIC | Age: 33
End: 2023-08-21
Attending: FAMILY MEDICINE
Payer: COMMERCIAL

## 2023-08-21 VITALS — WEIGHT: 125 LBS | BODY MASS INDEX: 23.6 KG/M2 | HEIGHT: 61 IN

## 2023-08-21 DIAGNOSIS — S69.91XA INJURY OF RIGHT THUMB, INITIAL ENCOUNTER: ICD-10-CM

## 2023-08-21 DIAGNOSIS — S63.641A GAMEKEEPER'S THUMB, RIGHT, INITIAL ENCOUNTER: Primary | ICD-10-CM

## 2023-08-21 PROCEDURE — 99214 OFFICE O/P EST MOD 30 MIN: CPT | Performed by: FAMILY MEDICINE
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
## 2023-09-06 ENCOUNTER — ANCILLARY PROCEDURE (OUTPATIENT)
Dept: GENERAL RADIOLOGY | Facility: CLINIC | Age: 33
End: 2023-09-06
Attending: PODIATRIST
Payer: COMMERCIAL

## 2023-09-06 ENCOUNTER — OFFICE VISIT (OUTPATIENT)
Dept: PODIATRY | Facility: CLINIC | Age: 33
End: 2023-09-06
Payer: COMMERCIAL

## 2023-09-06 VITALS
WEIGHT: 125 LBS | BODY MASS INDEX: 23.6 KG/M2 | SYSTOLIC BLOOD PRESSURE: 109 MMHG | HEART RATE: 92 BPM | DIASTOLIC BLOOD PRESSURE: 70 MMHG | HEIGHT: 61 IN

## 2023-09-06 DIAGNOSIS — S92.424D CLOSED NONDISPLACED FRACTURE OF DISTAL PHALANX OF RIGHT GREAT TOE WITH ROUTINE HEALING: Primary | ICD-10-CM

## 2023-09-06 PROCEDURE — 73630 X-RAY EXAM OF FOOT: CPT | Mod: TC | Performed by: RADIOLOGY

## 2023-09-06 PROCEDURE — 99213 OFFICE O/P EST LOW 20 MIN: CPT | Performed by: PODIATRIST

## 2023-09-06 ASSESSMENT — PAIN SCALES - GENERAL: PAINLEVEL: SEVERE PAIN (7)

## 2023-09-06 NOTE — NURSING NOTE
"Chief Complaint   Patient presents with    Fracture Followup     Right foot- still have some pain       Initial /70   Pulse 92   Ht 1.549 m (5' 1\")   Wt 56.7 kg (125 lb)   BMI 23.62 kg/m   Estimated body mass index is 23.62 kg/m  as calculated from the following:    Height as of this encounter: 1.549 m (5' 1\").    Weight as of this encounter: 56.7 kg (125 lb).  Medications and allergies reviewed.      Brooklyn FLORIAN MA    "

## 2023-09-06 NOTE — LETTER
"    9/6/2023         RE: Kyara Davis  [address] [facility] [address]        Dear Colleague,    Thank you for referring your patient, Kyara Davis, to the [facility] [place]. Please see a copy of my visit note below.    Kyara returns to the office for reevaluation of the right toe.  The patient relates following the instructions given at the last visit with noted overall less pain and more improvement in function of the right toe.   The patient relates no other problems.    Vitals: /70   Pulse 92   Ht 1.549 m (5' 1\")   Wt 56.7 kg (125 lb)   BMI 23.62 kg/m    BMI= Body mass index is 23.62 kg/m .    Lower Extremity Physical Exam:      Neurovascular status remains unchanged.  Muscular exam is within normal limits to major muscle groups.  Integument is intact.      Noted decreased pain on palpation over the distal aspect of the right great toe.  No erythema or edema noted.    Diagnostics:  Radiograph evaluation including three views of the right foot reveals interval healing with increased trabeculation of the distal phalanx fracture of the right great toe.  I personally evaluated the images as well as reviewed the images with the patient pointing out the findings.      Assessment:     ICD-10-CM    1. Closed nondisplaced fracture of distal phalanx of right great toe with routine healing  S92.424D XR Foot Right G/E 3 Views     Ankle/Foot Bracing Supplies DME Foot Insert; Right          Plan:    I have explained to Kyara about the progress of the conditions.  At this time, the patient was educated on the importance of offloading supportive shoes and other devices.  I demonstrated to the patient calf stretches to perform every hour daily until symptoms resolve.  After symptoms resolve, the patient was advised to perform the stretches 3 times daily to prevent future recurrence.  The patient was instructed to perform warm soaks with Epson salt after which to also apply " over-the-counter Voltaren gel to deeply massage the injured tissue.  The patient was instructed to do this on a daily basis until symptoms resolve.   The patient was fitted with a Dynaflex insert that will aid in offloading the tension forces to the soft tissues and prevent further inflammation.   The patient may return in four weeks for reevaluation to determine if any further treatment will be needed.      Kyara verbalized agreement with and understanding of the rational for the diagnosis and treatment plan.  All questions were answered to best of my ability and the patient's satisfaction. The patient was advised to contact the clinic with any questions that may arise after the clinic visit.      Disclaimer: This note consists of symbols derived from keyboarding, dictation and/or voice recognition software. As a result, there may be errors in the script that have gone undetected. Please consider this when interpreting information found in this chart.       DALTON Harkins D.P.M., F.A.C.F.A.S.      Again, thank you for allowing me to participate in the care of your patient.        Sincerely,        Abisai Harkins DPM

## 2023-09-06 NOTE — PROGRESS NOTES
"Kyara returns to the office for reevaluation of the right toe.  The patient relates following the instructions given at the last visit with noted overall less pain and more improvement in function of the right toe.   The patient relates no other problems.    Vitals: /70   Pulse 92   Ht 1.549 m (5' 1\")   Wt 56.7 kg (125 lb)   BMI 23.62 kg/m    BMI= Body mass index is 23.62 kg/m .    Lower Extremity Physical Exam:      Neurovascular status remains unchanged.  Muscular exam is within normal limits to major muscle groups.  Integument is intact.      Noted decreased pain on palpation over the distal aspect of the right great toe.  No erythema or edema noted.    Diagnostics:  Radiograph evaluation including three views of the right foot reveals interval healing with increased trabeculation of the distal phalanx fracture of the right great toe.  I personally evaluated the images as well as reviewed the images with the patient pointing out the findings.      Assessment:     ICD-10-CM    1. Closed nondisplaced fracture of distal phalanx of right great toe with routine healing  S92.424D XR Foot Right G/E 3 Views     Ankle/Foot Bracing Supplies DME Foot Insert; Right          Plan:    I have explained to Kyara about the progress of the conditions.  At this time, the patient was educated on the importance of offloading supportive shoes and other devices.  I demonstrated to the patient calf stretches to perform every hour daily until symptoms resolve.  After symptoms resolve, the patient was advised to perform the stretches 3 times daily to prevent future recurrence.  The patient was instructed to perform warm soaks with Epson salt after which to also apply over-the-counter Voltaren gel to deeply massage the injured tissue.  The patient was instructed to do this on a daily basis until symptoms resolve.   The patient was fitted with a Dynaflex insert that will aid in offloading the tension forces to the soft " tissues and prevent further inflammation.   The patient may return in four weeks for reevaluation to determine if any further treatment will be needed.      Kyara verbalized agreement with and understanding of the rational for the diagnosis and treatment plan.  All questions were answered to best of my ability and the patient's satisfaction. The patient was advised to contact the clinic with any questions that may arise after the clinic visit.      Disclaimer: This note consists of symbols derived from keyboarding, dictation and/or voice recognition software. As a result, there may be errors in the script that have gone undetected. Please consider this when interpreting information found in this chart.       DALTON Harkins D.P.M., [email or web address].S.

## 2023-09-06 NOTE — PATIENT INSTRUCTIONS

## 2024-04-27 ENCOUNTER — HEALTH MAINTENANCE LETTER (OUTPATIENT)
Age: 34
End: 2024-04-27
